# Patient Record
Sex: FEMALE | Race: WHITE | NOT HISPANIC OR LATINO
[De-identification: names, ages, dates, MRNs, and addresses within clinical notes are randomized per-mention and may not be internally consistent; named-entity substitution may affect disease eponyms.]

---

## 2017-08-30 ENCOUNTER — RESULT REVIEW (OUTPATIENT)
Age: 67
End: 2017-08-30

## 2018-01-20 ENCOUNTER — INPATIENT (INPATIENT)
Facility: HOSPITAL | Age: 68
LOS: 6 days | Discharge: HOME | DRG: 166 | End: 2018-01-27
Attending: HOSPITALIST | Admitting: EMERGENCY MEDICINE
Payer: MEDICARE

## 2018-01-20 VITALS
OXYGEN SATURATION: 93 % | DIASTOLIC BLOOD PRESSURE: 86 MMHG | HEART RATE: 91 BPM | TEMPERATURE: 97 F | SYSTOLIC BLOOD PRESSURE: 142 MMHG | RESPIRATION RATE: 18 BRPM

## 2018-01-20 DIAGNOSIS — R06.02 SHORTNESS OF BREATH: ICD-10-CM

## 2018-01-20 DIAGNOSIS — F32.9 MAJOR DEPRESSIVE DISORDER, SINGLE EPISODE, UNSPECIFIED: ICD-10-CM

## 2018-01-20 DIAGNOSIS — Z29.9 ENCOUNTER FOR PROPHYLACTIC MEASURES, UNSPECIFIED: ICD-10-CM

## 2018-01-20 DIAGNOSIS — M35.3 POLYMYALGIA RHEUMATICA: ICD-10-CM

## 2018-01-20 DIAGNOSIS — E03.9 HYPOTHYROIDISM, UNSPECIFIED: ICD-10-CM

## 2018-01-20 DIAGNOSIS — R80.9 PROTEINURIA, UNSPECIFIED: ICD-10-CM

## 2018-01-20 PROBLEM — Z00.00 ENCOUNTER FOR PREVENTIVE HEALTH EXAMINATION: Status: ACTIVE | Noted: 2018-01-20

## 2018-01-20 LAB
ALBUMIN SERPL ELPH-MCNC: 3.1 G/DL — LOW (ref 3.3–5)
ALP SERPL-CCNC: 61 U/L — SIGNIFICANT CHANGE UP (ref 40–120)
ALT FLD-CCNC: 40 U/L RC — SIGNIFICANT CHANGE UP (ref 10–45)
ANION GAP SERPL CALC-SCNC: 12 MMOL/L — SIGNIFICANT CHANGE UP (ref 5–17)
APAP SERPL-MCNC: <15 UG/ML — SIGNIFICANT CHANGE UP (ref 10–30)
APPEARANCE UR: CLEAR — SIGNIFICANT CHANGE UP
AST SERPL-CCNC: 28 U/L — SIGNIFICANT CHANGE UP (ref 10–40)
BASOPHILS # BLD AUTO: 0 K/UL — SIGNIFICANT CHANGE UP (ref 0–0.2)
BASOPHILS NFR BLD AUTO: 0.6 % — SIGNIFICANT CHANGE UP (ref 0–2)
BILIRUB SERPL-MCNC: 0.7 MG/DL — SIGNIFICANT CHANGE UP (ref 0.2–1.2)
BILIRUB UR-MCNC: NEGATIVE — SIGNIFICANT CHANGE UP
BUN SERPL-MCNC: 17 MG/DL — SIGNIFICANT CHANGE UP (ref 7–23)
CALCIUM SERPL-MCNC: 8.9 MG/DL — SIGNIFICANT CHANGE UP (ref 8.4–10.5)
CHLORIDE SERPL-SCNC: 101 MMOL/L — SIGNIFICANT CHANGE UP (ref 96–108)
CO2 SERPL-SCNC: 22 MMOL/L — SIGNIFICANT CHANGE UP (ref 22–31)
COLOR SPEC: YELLOW — SIGNIFICANT CHANGE UP
CREAT SERPL-MCNC: 0.87 MG/DL — SIGNIFICANT CHANGE UP (ref 0.5–1.3)
DIFF PNL FLD: NEGATIVE — SIGNIFICANT CHANGE UP
EOSINOPHIL # BLD AUTO: 0.1 K/UL — SIGNIFICANT CHANGE UP (ref 0–0.5)
EOSINOPHIL NFR BLD AUTO: 1.6 % — SIGNIFICANT CHANGE UP (ref 0–6)
EPI CELLS # UR: SIGNIFICANT CHANGE UP /HPF
ETHANOL SERPL-MCNC: SIGNIFICANT CHANGE UP MG/DL (ref 0–10)
GAS PNL BLDV: SIGNIFICANT CHANGE UP
GLUCOSE SERPL-MCNC: 114 MG/DL — HIGH (ref 70–99)
GLUCOSE UR QL: NEGATIVE — SIGNIFICANT CHANGE UP
HCT VFR BLD CALC: 40.6 % — SIGNIFICANT CHANGE UP (ref 34.5–45)
HGB BLD-MCNC: 13.9 G/DL — SIGNIFICANT CHANGE UP (ref 11.5–15.5)
KETONES UR-MCNC: ABNORMAL
LEUKOCYTE ESTERASE UR-ACNC: NEGATIVE — SIGNIFICANT CHANGE UP
LYMPHOCYTES # BLD AUTO: 1.1 K/UL — SIGNIFICANT CHANGE UP (ref 1–3.3)
LYMPHOCYTES # BLD AUTO: 13.3 % — SIGNIFICANT CHANGE UP (ref 13–44)
MCHC RBC-ENTMCNC: 32.9 PG — SIGNIFICANT CHANGE UP (ref 27–34)
MCHC RBC-ENTMCNC: 34.4 GM/DL — SIGNIFICANT CHANGE UP (ref 32–36)
MCV RBC AUTO: 95.9 FL — SIGNIFICANT CHANGE UP (ref 80–100)
MONOCYTES # BLD AUTO: 0.3 K/UL — SIGNIFICANT CHANGE UP (ref 0–0.9)
MONOCYTES NFR BLD AUTO: 3.5 % — SIGNIFICANT CHANGE UP (ref 2–14)
NEUTROPHILS # BLD AUTO: 6.7 K/UL — SIGNIFICANT CHANGE UP (ref 1.8–7.4)
NEUTROPHILS NFR BLD AUTO: 80.9 % — HIGH (ref 43–77)
NITRITE UR-MCNC: NEGATIVE — SIGNIFICANT CHANGE UP
PH UR: 7 — SIGNIFICANT CHANGE UP (ref 5–8)
PLATELET # BLD AUTO: 180 K/UL — SIGNIFICANT CHANGE UP (ref 150–400)
POTASSIUM SERPL-MCNC: 3.6 MMOL/L — SIGNIFICANT CHANGE UP (ref 3.5–5.3)
POTASSIUM SERPL-SCNC: 3.6 MMOL/L — SIGNIFICANT CHANGE UP (ref 3.5–5.3)
PROT SERPL-MCNC: 6.9 G/DL — SIGNIFICANT CHANGE UP (ref 6–8.3)
PROT UR-MCNC: 100 MG/DL
RAPID RVP RESULT: SIGNIFICANT CHANGE UP
RBC # BLD: 4.23 M/UL — SIGNIFICANT CHANGE UP (ref 3.8–5.2)
RBC # FLD: 13.8 % — SIGNIFICANT CHANGE UP (ref 10.3–14.5)
RBC CASTS # UR COMP ASSIST: SIGNIFICANT CHANGE UP /HPF (ref 0–2)
SALICYLATES SERPL-MCNC: <2 MG/DL — LOW (ref 15–30)
SODIUM SERPL-SCNC: 135 MMOL/L — SIGNIFICANT CHANGE UP (ref 135–145)
SP GR SPEC: >1.03 — HIGH (ref 1.01–1.02)
T3 SERPL-MCNC: 78 NG/DL — LOW (ref 80–200)
T4 AB SER-ACNC: 8.7 UG/DL — SIGNIFICANT CHANGE UP (ref 4.6–12)
TROPONIN T SERPL-MCNC: <0.01 NG/ML — SIGNIFICANT CHANGE UP (ref 0–0.06)
TSH SERPL-MCNC: 7.18 UIU/ML — HIGH (ref 0.27–4.2)
UROBILINOGEN FLD QL: 4 MG/DL
WBC # BLD: 8.2 K/UL — SIGNIFICANT CHANGE UP (ref 3.8–10.5)
WBC # FLD AUTO: 8.2 K/UL — SIGNIFICANT CHANGE UP (ref 3.8–10.5)
WBC UR QL: SIGNIFICANT CHANGE UP /HPF (ref 0–5)

## 2018-01-20 PROCEDURE — 71046 X-RAY EXAM CHEST 2 VIEWS: CPT | Mod: 26

## 2018-01-20 PROCEDURE — 99285 EMERGENCY DEPT VISIT HI MDM: CPT | Mod: 25

## 2018-01-20 PROCEDURE — 71275 CT ANGIOGRAPHY CHEST: CPT | Mod: 26

## 2018-01-20 PROCEDURE — 93010 ELECTROCARDIOGRAM REPORT: CPT

## 2018-01-20 PROCEDURE — 99223 1ST HOSP IP/OBS HIGH 75: CPT | Mod: GC

## 2018-01-20 RX ORDER — AZITHROMYCIN 500 MG/1
500 TABLET, FILM COATED ORAL ONCE
Qty: 0 | Refills: 0 | Status: COMPLETED | OUTPATIENT
Start: 2018-01-20 | End: 2018-01-20

## 2018-01-20 RX ORDER — LEVOTHYROXINE SODIUM 125 MCG
88 TABLET ORAL DAILY
Qty: 0 | Refills: 0 | Status: DISCONTINUED | OUTPATIENT
Start: 2018-01-20 | End: 2018-01-27

## 2018-01-20 RX ORDER — ENOXAPARIN SODIUM 100 MG/ML
40 INJECTION SUBCUTANEOUS EVERY 24 HOURS
Qty: 0 | Refills: 0 | Status: DISCONTINUED | OUTPATIENT
Start: 2018-01-20 | End: 2018-01-27

## 2018-01-20 RX ORDER — SERTRALINE 25 MG/1
50 TABLET, FILM COATED ORAL DAILY
Qty: 0 | Refills: 0 | Status: DISCONTINUED | OUTPATIENT
Start: 2018-01-20 | End: 2018-01-27

## 2018-01-20 RX ORDER — ACETAMINOPHEN 500 MG
650 TABLET ORAL ONCE
Qty: 0 | Refills: 0 | Status: COMPLETED | OUTPATIENT
Start: 2018-01-20 | End: 2018-01-20

## 2018-01-20 RX ORDER — SERTRALINE 25 MG/1
0 TABLET, FILM COATED ORAL
Qty: 0 | Refills: 0 | COMMUNITY

## 2018-01-20 RX ORDER — LEVOTHYROXINE SODIUM 125 MCG
0 TABLET ORAL
Qty: 0 | Refills: 0 | COMMUNITY

## 2018-01-20 RX ORDER — CEFTRIAXONE 500 MG/1
1 INJECTION, POWDER, FOR SOLUTION INTRAMUSCULAR; INTRAVENOUS ONCE
Qty: 0 | Refills: 0 | Status: COMPLETED | OUTPATIENT
Start: 2018-01-20 | End: 2018-01-20

## 2018-01-20 RX ADMIN — AZITHROMYCIN 250 MILLIGRAM(S): 500 TABLET, FILM COATED ORAL at 13:24

## 2018-01-20 RX ADMIN — CEFTRIAXONE 100 GRAM(S): 500 INJECTION, POWDER, FOR SOLUTION INTRAMUSCULAR; INTRAVENOUS at 12:26

## 2018-01-20 RX ADMIN — Medication 650 MILLIGRAM(S): at 17:16

## 2018-01-20 NOTE — H&P ADULT - FAMILY HISTORY
Sibling  Still living? Yes, Estimated age: Age Unknown  Family history of rheumatoid arthritis, Age at diagnosis: Age Unknown  Family history of Raynaud's syndrome, Age at diagnosis: Age Unknown  Family history of mixed connective tissue disease, Age at diagnosis: Age Unknown     Mother  Still living? Yes, Estimated age: Age Unknown  Family history of COPD (chronic obstructive pulmonary disease), Age at diagnosis: Age Unknown

## 2018-01-20 NOTE — H&P ADULT - PROBLEM SELECTOR PLAN 1
- progressively worsening SOB with palpitations, weakness and fatigue for 1 month, differential includes infectious vs rheumatologic (vasculitis, connective tissue disease) vs.   - saturating 93-97% on RA, not tachypneic however is tachycardic  - EKG showed sinus tachycardia  - CTA as above, b/l ground glass opacities  - unli - progressively worsening SOB with palpitations, weakness and fatigue for 1 month, differential includes infectious vs rheumatologic (vasculitis, connective tissue disease) vs. interstitial lung disease  - saturating 93-97% on RA, not tachypneic however is tachycardic  - EKG showed sinus tachycardia  - CTA as above, b/l ground glass opacities, negative for PE  - no evidence of fluid overload, ischemic changes on EKG, troponins neg x2, unlikely cardiac  - afebrile, no leukocytosis, s/p ceftriaxone/azithro in ED, unlikely infectious will hold off on ab for now  - unable to contact rheumatologist for outpt w/u, will resend rheumatologic w/u here given CT findings and family history  - f/u ANCA with reflex MPO and P3, OLEKSANDR, RF, C3, C4, antiGBM  - saturating well on RA, supp O2 if needed

## 2018-01-20 NOTE — H&P ADULT - PROBLEM SELECTOR PLAN 2
- no personal or family history of kidney disease, no history of diabetes  - reports darkening in color of urine, no other symptoms  - f/u rheumatologic w/u as above  - given recent weight gain and chronic steroid use, A1C in AM

## 2018-01-20 NOTE — ED ADULT NURSE NOTE - CHPI ED SYMPTOMS NEG
no dizziness/no tingling/no fever/no decreased eating/drinking/no numbness/no nausea/no vomiting/no pain

## 2018-01-20 NOTE — ED PROVIDER NOTE - MEDICAL DECISION MAKING DETAILS
67F situs inversus depression p/w decreased energy, depression, chest pain, difficulty breathing. stopped prednisone, synthroid, antidepressant last week with worsening of symptoms. cardiopulmoary exam WNL. Appears depressed with no SI/HI. Labs with BNP, TSH, cardiacs, CXR, EKG.

## 2018-01-20 NOTE — H&P ADULT - NSHPLABSRESULTS_GEN_ALL_CORE
Urinalysis + Microscopic Examination (01.20.18 @ 12:25)    Glucose Qualitative, Urine: Negative    Blood, Urine: Negative    Urine Appearance: Clear    Bilirubin: Negative    Color: Yellow    Urobilinogen: 4 mg/dL    Specific Gravity: >1.030    Protein, Urine: 100 mg/dL    pH Urine: 7.0    Leukocyte Esterase Concentration: Negative    Nitrite: Negative    Ketone - Urine: Trace    Red Blood Cell - Urine: 3-5 /HPF    White Blood Cell - Urine: 3-5 /HPF    Epithelial Cells: OCC /HPF    Comprehensive Metabolic Panel (01.20.18 @ 05:01)    Sodium, Serum: 135 mmol/L    Potassium, Serum: 3.6 mmol/L    Chloride, Serum: 101 mmol/L    Carbon Dioxide, Serum: 22 mmol/L    Anion Gap, Serum: 12 mmol/L    Blood Urea Nitrogen, Serum: 17 mg/dL    Creatinine, Serum: 0.87 mg/dL    Glucose, Serum: 114 mg/dL    Calcium, Total Serum: 8.9 mg/dL    Protein Total, Serum: 6.9 g/dL    Albumin, Serum: 3.1 g/dL    Bilirubin Total, Serum: 0.7 mg/dL    Alkaline Phosphatase, Serum: 61 U/L    Aspartate Aminotransferase (AST/SGOT): 28 U/L    Alanine Aminotransferase (ALT/SGPT): 40 U/L RC    eGFR if Non : 68: Interpretative comment    Complete Blood Count + Automated Diff (01.20.18 @ 05:01)    WBC Count: 8.2 K/uL    RBC Count: 4.23 M/uL    Hemoglobin: 13.9 g/dL    Hematocrit: 40.6 %    Mean Cell Volume: 95.9 fl    Mean Cell Hemoglobin: 32.9 pg    Mean Cell Hemoglobin Conc: 34.4 gm/dL    Red Cell Distrib Width: 13.8 %    Platelet Count - Automated: 180 K/uL    Auto Neutrophil #: 6.7 K/uL    Auto Lymphocyte #: 1.1 K/uL    Auto Monocyte #: 0.3 K/uL    Auto Eosinophil #: 0.1 K/uL    Auto Basophil #: 0.0 K/uL    Auto Neutrophil %: 80.9: Differential percentages must be correlated with absolute numbers for  clinical significance. %    Auto Lymphocyte %: 13.3 %    Auto Monocyte %: 3.5 %    Auto Eosinophil %: 1.6 %    Auto Basophil %: 0.6 %    < from: CT Angio Chest w/ IV Cont (01.20.18 @ 08:50) >  IMPRESSION:   No pulmonary embolism.  Scattered bilateral perihilar groundglass opacities with interlobular   septal thickening. The differential diagnosis includes  mild pulmonary   edema and pneumonia.  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

## 2018-01-20 NOTE — ED PROVIDER NOTE - ATTENDING CONTRIBUTION TO CARE
Patient presenting with shortness of breath occurring in episodes over the past week, associated with generalized malaise/weakness/lack of energy.  Patient reporting that beginning in october she was having generalized muscle aches and pains after starting a statin which have now resolved since discontinuing, however in process of workup saw a rheumatologist who started her on a long steroid taper.  Last week as she began having weakness, tried seeing PMD/rheumatologist but was not able to be seen.  Reporting weakness/shortness of breath always present but acutely worsens some days without clear triggers.  Denying chest pains.  Able to ambulate but having difficulty with accomplishing ADLs due to weakness/malaise.  Some occasional mild headaches.  No visual changes, no abdominal pains N/V/C/D, no focal weakness or sensation changes, no dysuria.  No fevers or chills.    On exam patients vital signs within normal limits, RRR S1/S2, lungs clear to ascultation bilaterally, abdomen soft non tender, normal strength/sensation, no noted rashes.    Patient presenting with generalized malaise/weakness of unclear etiology, currently afebrile with normal vital signs, do not believe patient actively septic, history seems less consistent with ACS but cannot rule out by history and physical alone, plan for labs, CXR, TSH, CE/BNP, UA, re-evaluation.

## 2018-01-20 NOTE — H&P ADULT - HISTORY OF PRESENT ILLNESS
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 In November 2017 she started having diffuse sharp muscle pains throughout her body and bulging of her muscles. She was sent to a rheumatologist and diagnosed with PMR. She had a temporal artery biopsy which was negative for GCA. She was started on prednisone 6mg in the beginning of December and has been tapering down since. She is currently prescribed 3mg. In mid December her muscle pains and bulging did not improve with the steroids so she stopped taking her crestor as she has had muscle cramps in the past with other statins. After this the pains had resolved and she has not had any since. However, since starting the steroids she has had increasingly worsening SOB where she becomes tachypneic and feels she cannot get a good breath in. This is associated with palpitations. She also reports some dull left sided chest pain when she becomes short of breath that does not radiate. Denies lightheadedness, dizziness or vision changes. She reports a 13-15 pound weight gain in the past 1-2 months. These symptoms have gotten significantly worse since Sunday and she cannot recall when she stopped taking her medications but she does remember that she has not taken them consistently this week. She reports weakness to the point of not being able to get out full sentences and has not had much physical activity.    In the ED, vitals were 67F with situs inversus, PMR on steroids, hyperlipidemia, hypothyroidism presenting with 1 month of progressively worsening shortness of breath, palpitations, weakness and fatigue. In November 2017 she started having diffuse sharp muscle pains throughout her body and bulging of her muscles. She was sent to a rheumatologist and diagnosed with PMR. She had a temporal artery biopsy which was negative for GCA. She was started on prednisone 6mg in the beginning of December and has been tapering down since. She is currently prescribed 3mg. In mid December her muscle pains and bulging did not improve with the steroids so she stopped taking her crestor as she has had muscle cramps in the past with other statins. After this the pains had resolved and she has not had any since. However, since starting the steroids she has had increasingly worsening SOB where she becomes tachypneic and feels she cannot get a good breath in. This is associated with palpitations. She also reports some dull left sided chest pain when she becomes short of breath that does not radiate. Denies lightheadedness, dizziness or vision changes. She reports a 13-15 pound weight gain in the past 1-2 months. These symptoms have gotten significantly worse since Sunday and she cannot recall when she stopped taking her medications but she does remember that she has not taken them consistently this week. She reports weakness to the point of not being able to get out full sentences and has not had much physical activity.    In the ED, vitals were 142/86, 97.2F, HR 91, 18-93%RA. CTA showed b/l groundglass opacities and UA significant for proteinuria. She was given ceftriaxone/azithro.

## 2018-01-20 NOTE — ED PROVIDER NOTE - OBJECTIVE STATEMENT
66yo female with pmhx of situs inversus w/ dextrocardia, HLD and hypothyroidism who presents with two of weeks of worsening dyspnea, myalgia and generalized weakness.  Pt states that she believes her weakness began shortly after beginning new medication Crestor.  Pt also endorses chest pain with deep breathing.  Pt was seen by her PMD who recently diagnosed her with Polymyalgia Rheumatica.  Pt was started on 40 mg of Prednisone and a temporal artery biopsy was performed.  Biopsy was found to be negative for Giant Cell Arteritis. Dosage of prednisone was decreased to 30 mg, however, pt reports she stopped taking all medications including synthroid and prednisone abruptly without completing taper about 1 week ago.  Of note, pt endorses feelings of depression.    last year.  Pt denies fever, chills, chest pain, palpitations, dizziness, recent cough, nausea, vomiting, diarrhea, abdominal pain, bladder and bowel problems, back pain, leg swelling, sick contact, recent long travel.    ROS  depression  caring for mother      Significant past medical hx/surgical hx/social hx and review of systems can be found above in the history of present illness. 66yo female pmh of situs inversus w/ dextrocardia, HLD and hypothyroidism p/w worsening dyspnea, myalgia and generalized weakness x 2w.  Weakness began shortly after beginning new medication Crestor 4 months ago. Pt was seen by her PMD who recently diagnosed her with Polymyalgia Rheumatica, started on 40mg Prednisone and a temporal artery biopsy - negative for Giant Cell Arteritis. Prednisone was decreased to 30 mg, however, pt reports she stopped taking all medications including synthroid and prednisone abruptly without completing taper about 1 week ago.  Of note, pt endorses feelings of depression.    2 years ago.  Pt denies fever, chills, chest pain, palpitations, dizziness, recent cough, nausea, vomiting, diarrhea, abdominal pain, bladder and bowel problems, back pain, leg swelling, sick contact, recent long travel, SI, HI    Significant past medical hx/surgical hx/social hx and review of systems can be found above in the history of present illness.

## 2018-01-20 NOTE — ED PROVIDER NOTE - PROGRESS NOTE DETAILS
Pt appears improved, eyes open, states she feels better although is having pleuritic chest pain. Ordered CTA r/o PE/ Attending MD MAGAÑA.  PT signed out to me in stable condition. Pt is a 67 yr old female with hx of HLD, hypothyroidism previously now presented to ED with SOB and intermittent CP happening over last week.  Pt has been having ongoing muscle pains for a month, started on a statin.  Saw rheum who bxed for GCA, polymyalagia rhuemat but neg.  Pt stopped statin and pain went away however now over the last few days pt has been having generalized pain/weakness.  Seen at ED sev'l days ago but they 'did nothing'.  Vitals wnl.  Pt is afebrile.  Pt states she is beginning to feel better without intervention in ED.  Pt to be reassessed and referred to a new PMD at her request.  Pt has situs inversus.  Pt pending CTA for eval for poss PE given report of mild chest discomfort with deep inspiration.  Plan to obtain repeat troponin, CTA chest and refer to new PCP as outpt. [employer]:  Pt has had episode of desat in ED and endorses persistent chest discomfort.  CT c/w poss PNA vs. pulm edema.  Pt recently worked up for unclear poss rheumatologic disorder.  Concern for possibel underlying rheumatologic pulmonary disease.  Warrants admission for supportive care and possible rheum/ACS work-up.  Will tx for infection given poss PNA. [employer]:  Pt has had episode of desat in ED and endorses persistent chest discomfort.  Also noted to be persistently tachycardic.  CT c/w poss PNA vs. pulm edema without evidence of PE.  Pt recently worked up for unclear poss rheumatologic disorder.  Concern for possibel underlying rheumatologic pulmonary disease.  Warrants admission for supportive care and possible rheum/ACS work-up.  Will tx for infection given poss PNA.

## 2018-01-20 NOTE — H&P ADULT - ATTENDING COMMENTS
67F with situs inversus, PMR on Prednisone ,HLD, Hypothyroidism who presents with c/o progressively worsening shortness of breath, palpitations, weakness and fatigue x 1 month. Also c/o left sided CP. On exam, AAO x 3, tachycardic at 111bpm, RR is 18, saturating at 95% on room air. Chest is clear to auscultation b/l. Labs are negative for leukocytosis; Troponins are negative x 2; RVP negative. CTA chest is negative for PE but demonstrates b/l ground glass opacities.   Assessment : Dyspnea; unlikely to be infectious etiology in the absence of leukocytosis/fever; possible interstitial lung dxs  Plan : c/w prednisone for now, O2 supplementation prn. hold off on abx, f/up rheumatological w/up, obtain collateral information from pt's primary rheumatologist, c/w home medications ( synthroid, sertraline), dvt ppx.

## 2018-01-20 NOTE — H&P ADULT - NSHPPHYSICALEXAM_GEN_ALL_CORE
GENERAL: NAD, well-developed, laying in stretcher, periodically answers questions with eyes closed, appears fatigued  HEAD:  Atraumatic, Normocephalic  EYES: EOMI, PERRLA, conjunctiva and sclera clear  NECK: Supple, No JVD  CHEST/LUNG: Clear to auscultation bilaterally; No wheezes, rales or rhonchi  HEART: tachycardic rate and regular rhythm; No murmurs, rubs, or gallops  ABDOMEN: Soft, Nontender, Nondistended; Bowel sounds present  EXTREMITIES:  2+ Peripheral Pulses, No clubbing, cyanosis, or edema, tenderness to palpation of b/l calves  PSYCH: AAOx3  NEUROLOGY: non-focal  SKIN: No rashes or lesions

## 2018-01-20 NOTE — H&P ADULT - ASSESSMENT
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

## 2018-01-20 NOTE — ED ADULT NURSE REASSESSMENT NOTE - NS ED NURSE REASSESS COMMENT FT1
RN spoke with MD Galicia, per MD she will order 1 time tylenol order for pt. Safety and comfort measures maintained.

## 2018-01-20 NOTE — H&P ADULT - NSHPREVIEWOFSYSTEMS_GEN_ALL_CORE
CONSTITUTIONAL:  + weight gain, + weakness and fatigue, No fever, chills  HEENT:  Eyes:  No visual loss, blurred vision, double vision or yellow sclerae.   Ears, Nose, Throat: +bloody nasal discharge, No hearing loss  SKIN:  No rash or itching.  CARDIOVASCULAR:  + chest pain, +palpitations, No chest pressure or chest discomfort. No edema  RESPIRATORY:  + shortness of breath, No cough or sputum.  GASTROINTESTINAL:  No anorexia, nausea, vomiting or diarrhea. No abdominal pain or blood.  GENITOURINARY:  No hematuria, dysuria.   NEUROLOGICAL:  + headache, No dizziness, syncope, paralysis, ataxia, numbness or tingling in the extremities. No change in bowel or bladder control.  MUSCULOSKELETAL:  No muscle, back pain, joint pain or stiffness.  PSYCHIATRIC:  No history of depression or anxiety.  ENDOCRINOLOGIC:  No reports of sweating, cold or heat intolerance. No polyuria or polydipsia.

## 2018-01-20 NOTE — H&P ADULT - NSHPSOCIALHISTORY_GEN_ALL_CORE
Patient lives at home with her mother. Remote smoking history 40 years ago, 3 cigarettes weekly. No alcohol use.

## 2018-01-20 NOTE — H&P ADULT - PMH
Depression    Hyperlipidemia, unspecified hyperlipidemia type    Hypothyroid    Polymyalgia rheumatica

## 2018-01-20 NOTE — ED ADULT NURSE REASSESSMENT NOTE - NS ED NURSE REASSESS COMMENT FT1
Report received from SILVANA Moy.  VSS NAD at this time. A&Ox4 gross neuro intact, lungs cta bilaterally, no difficulty speaking in complete sentences, s1s2 heart sounds heard, pulses x 4, kerr x4, abdomen soft nontender nondistended, skin intact. Plan is for pt. to have CT scan and then possibly be admitted. Safety and comfort measures maintained.

## 2018-01-20 NOTE — H&P ADULT - PROBLEM SELECTOR PLAN 4
- diagnosed in November after complaining of diffuse muscle pains, pt states rheumatologist diagnosed with a blood test but unsure of what it was  - started on steroids at that time, with resolution of muscle pains only after stopping crestor 2 weeks later  - will continue with steroids here as she has chronically been on them pending further rheum w/u, may need higher dose

## 2018-01-20 NOTE — ED ADULT NURSE REASSESSMENT NOTE - NS ED NURSE REASSESS COMMENT FT1
Pt found to have temp 100.4. MD Galicia paged. Awaiting to receive response from MD. Safety and comfort measures maintained. Pt. has no complaints at this time.

## 2018-01-20 NOTE — ED ADULT NURSE REASSESSMENT NOTE - NS ED NURSE REASSESS COMMENT FT1
Pt currently resting comfortably in hallway. Pt admitted currently awaiting inpatient bed. Lunch provided to patient. Safety and comfort measures maintained.

## 2018-01-20 NOTE — ED ADULT NURSE NOTE - OBJECTIVE STATEMENT
Patient presents with c/o SOB x4 weeks worsening over the past few days. Patient with history of depression and hypothyroidism presents with c/o SOB x4 weeks worsening over the past few days. Patient with history of depression and hypothyroidism presents with c/o SOB x4 weeks worsening over the past few days.  Patient states symptoms began shortly after beginning to take Crestor, has stopped taking it.  Patient was sent to rheumatologist and diagnosed with Polymyalgia Rheumatica and started on steroids, which she stopped taking abruptly.  Patient was taking zoloft,  passed away last year.  Patient denies CP. pain with deep inspiration, recent long trips.  Patient stopped smoking about thirty years ago.  No swelling of lower extremities.

## 2018-01-21 LAB
ANION GAP SERPL CALC-SCNC: 12 MMOL/L — SIGNIFICANT CHANGE UP (ref 5–17)
BUN SERPL-MCNC: 15 MG/DL — SIGNIFICANT CHANGE UP (ref 7–23)
CALCIUM SERPL-MCNC: 8.9 MG/DL — SIGNIFICANT CHANGE UP (ref 8.4–10.5)
CHLORIDE SERPL-SCNC: 101 MMOL/L — SIGNIFICANT CHANGE UP (ref 96–108)
CO2 SERPL-SCNC: 22 MMOL/L — SIGNIFICANT CHANGE UP (ref 22–31)
CREAT SERPL-MCNC: 0.81 MG/DL — SIGNIFICANT CHANGE UP (ref 0.5–1.3)
GLUCOSE SERPL-MCNC: 127 MG/DL — HIGH (ref 70–99)
HBA1C BLD-MCNC: 6.7 % — HIGH (ref 4–5.6)
HCT VFR BLD CALC: 38.7 % — SIGNIFICANT CHANGE UP (ref 34.5–45)
HGB BLD-MCNC: 13.1 G/DL — SIGNIFICANT CHANGE UP (ref 11.5–15.5)
MAGNESIUM SERPL-MCNC: 1.8 MG/DL — SIGNIFICANT CHANGE UP (ref 1.6–2.6)
MCHC RBC-ENTMCNC: 32 PG — SIGNIFICANT CHANGE UP (ref 27–34)
MCHC RBC-ENTMCNC: 33.9 GM/DL — SIGNIFICANT CHANGE UP (ref 32–36)
MCV RBC AUTO: 94.4 FL — SIGNIFICANT CHANGE UP (ref 80–100)
PLATELET # BLD AUTO: 198 K/UL — SIGNIFICANT CHANGE UP (ref 150–400)
POTASSIUM SERPL-MCNC: 3.8 MMOL/L — SIGNIFICANT CHANGE UP (ref 3.5–5.3)
POTASSIUM SERPL-SCNC: 3.8 MMOL/L — SIGNIFICANT CHANGE UP (ref 3.5–5.3)
RBC # BLD: 4.1 M/UL — SIGNIFICANT CHANGE UP (ref 3.8–5.2)
RBC # FLD: 15.2 % — HIGH (ref 10.3–14.5)
RHEUMATOID FACT SERPL-ACNC: <7 IU/ML — SIGNIFICANT CHANGE UP (ref 0–13.9)
SODIUM SERPL-SCNC: 135 MMOL/L — SIGNIFICANT CHANGE UP (ref 135–145)
WBC # BLD: 7.27 K/UL — SIGNIFICANT CHANGE UP (ref 3.8–10.5)
WBC # FLD AUTO: 7.27 K/UL — SIGNIFICANT CHANGE UP (ref 3.8–10.5)

## 2018-01-21 PROCEDURE — 99233 SBSQ HOSP IP/OBS HIGH 50: CPT | Mod: GC

## 2018-01-21 RX ORDER — ACETAMINOPHEN 500 MG
650 TABLET ORAL EVERY 6 HOURS
Qty: 0 | Refills: 0 | Status: DISCONTINUED | OUTPATIENT
Start: 2018-01-21 | End: 2018-01-27

## 2018-01-21 RX ADMIN — Medication 3 MILLIGRAM(S): at 06:38

## 2018-01-21 RX ADMIN — Medication 88 MICROGRAM(S): at 06:38

## 2018-01-21 RX ADMIN — Medication 650 MILLIGRAM(S): at 16:13

## 2018-01-21 RX ADMIN — Medication 7 MILLIGRAM(S): at 20:05

## 2018-01-21 RX ADMIN — Medication 650 MILLIGRAM(S): at 17:00

## 2018-01-21 RX ADMIN — SERTRALINE 50 MILLIGRAM(S): 25 TABLET, FILM COATED ORAL at 14:40

## 2018-01-21 RX ADMIN — ENOXAPARIN SODIUM 40 MILLIGRAM(S): 100 INJECTION SUBCUTANEOUS at 06:38

## 2018-01-21 NOTE — PROGRESS NOTE ADULT - PROBLEM SELECTOR PLAN 1
- progressively worsening SOB with palpitations, weakness and fatigue for 1 month, differential includes infectious vs rheumatologic (vasculitis, connective tissue disease) vs. interstitial lung disease  - saturating 93-97% on RA, not tachypneic however is tachycardic  - EKG showed sinus tachycardia  - CTA as above, b/l ground glass opacities, negative for PE  - no evidence of fluid overload, ischemic changes on EKG, troponins neg x2, unlikely cardiac  - afebrile, no leukocytosis, s/p ceftriaxone/azithro in ED, unlikely infectious will hold off on ab for now  - unable to contact rheumatologist for outpt w/u, will resend rheumatologic w/u here given CT findings and family history  - f/u ANCA with reflex MPO and P3, OLEKSANDR, RF, C3, C4, antiGBM  - saturating well on RA, supp O2 if needed

## 2018-01-21 NOTE — CONSULT NOTE ADULT - ASSESSMENT
68 yo F with recent dx of PMR on chronic steroids since Nov, now with severe SOB with saturation around 93-95% on NC  Concern for infectious etiology (PCP) vs autoimmune etiology (?AAV with hx of nasal crusting)    Plan:  - would review CT chest with radiology to specificy GGO - ?NSIP/UIP  - obtain LDH and Beta-D-glucan assay for PCP  - ambulate pt and record saturation  - obtain ID eval for possible PCP dx and therapy  - prednisone 10mg daily (pt last dose that she recalls)    Dr. Castro  Rheumatology Fellow  [phone] 68 yo F with recent dx of PMR on chronic steroids since Nov, now with severe SOB with saturation around 93-95% on NC  Concern for infectious etiology (PCP) vs autoimmune etiology (?AAV with hx of nasal crusting) although unusual to develop vasculitis or pneumonitis on high dose steroids which the patient has been on since late Nov 2017. Less likely PMR given history and that she has been on high doses of prednisone.      Plan:  - would review CT chest with radiology to specify GGO - ?NSIP/UIP  - obtain LDH and Beta-D-glucan assay for PCP  - ambulate pt and record saturation  - obtain ID eval for possible PCP dx and therapy  - prednisone 10mg daily (pt last dose that she recalls)  - F/u serologies.  - Urine TP/Cr ordered to quantify proteinuria  - will try to obtain prior records from her rheumatologist in AM    Dr. Castro  Rheumatology Fellow  [phone]

## 2018-01-21 NOTE — PROGRESS NOTE ADULT - ASSESSMENT
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 Pending rheumatologic w/u.

## 2018-01-21 NOTE — CONSULT NOTE ADULT - SUBJECTIVE AND OBJECTIVE BOX
JOHN GAMEZ  [id number]    HISTORY OF PRESENT ILLNESS:    66 yo F with recent dx of PMR, was on prednisone ?10-20mg daily, now admitted for SOB and palpitation.  States she developed diffuse myalgia last September; incidentally she was started on statin a few months prior to the onset of this muscle pain.  She recalls, she has history of intolerance to statin as it made her feel uncomfortable.  She was seen by a rheum in Nov and ultimately dx with PMR/GCA based on the myalgia (no weakness was noted) - s/p L temporal artery bx which was negative; pt did not have any vision changes or jaw claudication.  At the time of dx pt was started on Prednisone 60mg daily and states she had no change in her symptoms with the prednisone.  She decided to self d/c statin and she felt much better after two days.  She was tapered down from prednisone 60mg to ~20mg over the last one month.  Reports feeling SOB over the last two weeks.  She also has dec appeite and fatigue for the last two weeks - she became so fatigued she stopped takign her medications, including prednisone abruptly as of last Tuesday.  reports bloody nasal crusting but no epistanxis or hemoptysis. Reports changes in her voice.  No consititional symtpoms.  No oral uclers, LAD, chest pain, abd pain, dysuria, hematuria, foamy urine, photosensitivity, falls, muscle weakness, joint pain or morning stiffness or raynouds.    Pt is retirned, lives with her mother  Never has been preg  Sister with hx of RA/MCTD/Raynouds/follows at [facility]    PAST MEDICAL & SURGICAL HISTORY:  Hyperlipidemia, unspecified hyperlipidemia type  Polymyalgia rheumatica  Depression  Hypothyroid      Review of Systems:  as aboev    MEDICATIONS  (STANDING):  enoxaparin Injectable 40 milliGRAM(s) SubCutaneous every 24 hours  levothyroxine 88 MICROGram(s) Oral daily  predniSONE   Tablet 3 milliGRAM(s) Oral daily  sertraline 50 milliGRAM(s) Oral daily    MEDICATIONS  (PRN):  acetaminophen   Tablet. 650 milliGRAM(s) Oral every 6 hours PRN mild or moderate pain      Allergies    Vitamin E (Hives)    Intolerances    Artifical Cherry Flavoring (Nausea)  narcotic analgesics (Unknown)      PERTINENT MEDICATION HISTORY:    SOCIAL HISTORY: non smoker, no alcohol  OCCUPATION: retired  TRAVEL HISTORY: none    FAMILY HISTORY:  Family history of COPD (chronic obstructive pulmonary disease) (Mother)  Family history of mixed connective tissue disease (Sibling)  Family history of Raynaud's syndrome (Sibling)  Family history of rheumatoid arthritis (Sibling)      Vital Signs Last 24 Hrs  T(C): 36.6 (21 Jan 2018 07:21), Max: 38 (20 Jan 2018 16:33)  T(F): 97.8 (21 Jan 2018 07:21), Max: 100.4 (20 Jan 2018 16:33)  HR: 98 (21 Jan 2018 07:21) (94 - 108)  BP: 120/78 (21 Jan 2018 07:21) (109/76 - 124/75)  BP(mean): --  RR: 20 (21 Jan 2018 07:21) (18 - 20)  SpO2: 95% (21 Jan 2018 07:21) (94% - 97%)    Daily Height in cm: 165.1 (20 Jan 2018 22:40)    Daily     Physical Exam:  General: mild distress - crying  HEENT: EOMI, MMM  CVS: +S1/S2, RRR  Resp: CTA b/l  GI: Soft, NT/ND +BS  MSK: MS 5/5 in UE and LE b/l; no synovitis  Neuro: AAOx3  Skin: no visible rashes    LABS:                        13.1   7.27  )-----------( 198      ( 21 Jan 2018 08:43 )             38.7     01-21    135  |  101  |  15  ----------------------------<  127<H>  3.8   |  22  |  0.81    Ca    8.9      21 Jan 2018 08:48  Mg     1.8     01-21    TPro  6.9  /  Alb  3.1<L>  /  TBili  0.7  /  DBili  x   /  AST  28  /  ALT  40  /  AlkPhos  61  01-20      Urinalysis Basic - ( 20 Jan 2018 12:25 )    Color: Yellow / Appearance: Clear / SG: >1.030 / pH: x  Gluc: x / Ketone: Trace  / Bili: Negative / Urobili: 4 mg/dL   Blood: x / Protein: 100 mg/dL / Nitrite: Negative   Leuk Esterase: Negative / RBC: 3-5 /HPF / WBC 3-5 /HPF   Sq Epi: x / Non Sq Epi: OCC /HPF / Bacteria: x        RADIOLOGY & ADDITIONAL STUDIES:  < from: CT Angio Chest w/ IV Cont (01.20.18 @ 08:50) >    EXAM:  CT ANGIO CHEST (W)AW IC                            PROCEDURE DATE:  01/20/2018            INTERPRETATION:  CLINICAL INFORMATION: Shortness of breath.    COMPARISON: Correlation is made with chest x-ray dated 1/20/2018 at 5:51   AM.    PROCEDURE:   CTA of the Chest was performed with intravenous contrast.  90 ml of Omnipaque 350 was injected intravenously. 10 ml were discarded.  Sagittal and coronal reformats were performed as well as 3D Mini MIPs.      FINDINGS:    CHEST:     LUNGS AND LARGE AIRWAYS: Patent central airways. Scattered bilateral   perihilar groundglass opacities, with upper lobe predominance and   interlobular septal thickening.  PLEURA: No pleural effusion.  VESSELS: No pulmonary embolism.  HEART: Dextrocardia. Right aortic arch. No pericardial effusion.  MEDIASTINUM AND NEEL: Less than 1 cm bilateral hilar lymph nodes.  CHEST WALL AND LOWER NECK: Thyroid gland is diminutive.  VISUALIZED UPPER ABDOMEN: Situs inversus. Stenosis of the proximal celiac   artery.  BONES: Mild degenerative changes in the spine.    IMPRESSION:   No pulmonary embolism.    Scattered bilateral perihilar groundglass opacities with interlobular   septal thickening. The differential diagnosis includes  mild pulmonary   edema and pneumonia.    Situs inversus.    Stenosis of the proximal celiac artery.

## 2018-01-21 NOTE — PROGRESS NOTE ADULT - SUBJECTIVE AND OBJECTIVE BOX
Patient is a 67y old  Female who presents with a chief complaint of SOB, palpitations (20 Jan 2018 14:58)      SUBJECTIVE / OVERNIGHT EVENTS:    MEDICATIONS  (STANDING):  enoxaparin Injectable 40 milliGRAM(s) SubCutaneous every 24 hours  levothyroxine 88 MICROGram(s) Oral daily  predniSONE   Tablet 3 milliGRAM(s) Oral daily  sertraline 50 milliGRAM(s) Oral daily      PHYSICAL EXAM:  Vital Signs Last 24 Hrs  T(C): 36.6 (21 Jan 2018 07:21), Max: 38 (20 Jan 2018 16:33)  T(F): 97.8 (21 Jan 2018 07:21), Max: 100.4 (20 Jan 2018 16:33)  HR: 98 (21 Jan 2018 07:21) (90 - 111)  BP: 120/78 (21 Jan 2018 07:21) (109/76 - 124/75)  BP(mean): --  RR: 20 (21 Jan 2018 07:21) (15 - 20)  SpO2: 95% (21 Jan 2018 07:21) (94% - 97%)    GENERAL: NAD, well-developed  HEAD:  Atraumatic, Normocephalic  EYES: EOMI, PERRLA, conjunctiva and sclera clear  NECK: Supple, No JVD  CHEST/LUNG: Clear to auscultation bilaterally; No wheeze  HEART: Regular rate and rhythm; No murmurs, rubs, or gallops  ABDOMEN: Soft, Nontender, Nondistended; Bowel sounds present  EXTREMITIES:  2+ Peripheral Pulses, No clubbing, cyanosis, or edema, tenderness to palpation of b/l calves  PSYCH: AAOx3  NEUROLOGY: non-focal  SKIN: No rashes or lesions    LABS:    WBC Trend: 8.2<--  Hb Trend: 13.9<--  Plt Trend: 180<--  01-20    135  |  101  |  17  ----------------------------<  114<H>  3.6   |  22  |  0.87    Ca    8.9      20 Jan 2018 05:01    TPro  6.9  /  Alb  3.1<L>  /  TBili  0.7  /  DBili  x   /  AST  28  /  ALT  40  /  AlkPhos  61  01-20    Creatinine Trend: 0.87<--    CARDIAC MARKERS ( 20 Jan 2018 10:00 )  Trop <0.01 ng/mL / CK x     / CKMB x       CARDIAC MARKERS ( 20 Jan 2018 05:01 )  Trop <0.01 ng/mL / CK 66 U/L / CKMB x           Urinalysis Basic - ( 20 Jan 2018 12:25 )    Color: Yellow / Appearance: Clear / SG: >1.030 / pH: x  Gluc: x / Ketone: Trace  / Bili: Negative / Urobili: 4 mg/dL   Blood: x / Protein: 100 mg/dL / Nitrite: Negative   Leuk Esterase: Negative / RBC: 3-5 /HPF / WBC 3-5 /HPF   Sq Epi: x / Non Sq Epi: OCC /HPF / Bacteria: x        Microbiology:  Urine Cx:  Blood Cx:    RADIOLOGY & ADDITIONAL TESTS:  X- Ray:  CT:  Ultrasound:  [ ] imaging personally reviewed and interpreted by me    Consultant(s) Notes Reviewed:      Care Discussed with Consultants/Other Providers: Patient is a 67y old  Female who presents with a chief complaint of SOB, palpitations (20 Jan 2018 14:58)      SUBJECTIVE / OVERNIGHT EVENTS: No acute events overnight. Patient states she feels more alert today however continues to have SOB and fatigue. Also complaining of muscle pains in her back as well as "Bone" pain in her lower back. Denies CP, dizziness, abdominal pain, N/V. Reports she does have a mild headache that has been going on for a while that migrates throughout her head.    MEDICATIONS  (STANDING):  enoxaparin Injectable 40 milliGRAM(s) SubCutaneous every 24 hours  levothyroxine 88 MICROGram(s) Oral daily  predniSONE   Tablet 3 milliGRAM(s) Oral daily  sertraline 50 milliGRAM(s) Oral daily      PHYSICAL EXAM:  Vital Signs Last 24 Hrs  T(C): 36.6 (21 Jan 2018 07:21), Max: 38 (20 Jan 2018 16:33)  T(F): 97.8 (21 Jan 2018 07:21), Max: 100.4 (20 Jan 2018 16:33)  HR: 98 (21 Jan 2018 07:21) (90 - 111)  BP: 120/78 (21 Jan 2018 07:21) (109/76 - 124/75)  BP(mean): --  RR: 20 (21 Jan 2018 07:21) (15 - 20)  SpO2: 95% (21 Jan 2018 07:21) (94% - 97%)    GENERAL: NAD, well-developed  HEAD:  Atraumatic, Normocephalic  EYES: EOMI, PERRLA, conjunctiva and sclera clear  NECK: Supple, No JVD  CHEST/LUNG: Clear to auscultation bilaterally; No wheeze  HEART: Regular rate and rhythm; No murmurs, rubs, or gallops  ABDOMEN: Soft, Nontender, Nondistended; Bowel sounds present  BACK: ttp of paraspinal muscles in thoracic region, ttp at SI joints, no tenderness on spine  EXTREMITIES:  2+ Peripheral Pulses, No clubbing, cyanosis, or edema, tenderness to palpation of b/l calves  PSYCH: AAOx3  NEUROLOGY: non-focal  SKIN: No rashes or lesions    LABS:    WBC Trend: 8.2<--  Hb Trend: 13.9<--  Plt Trend: 180<--  01-20    135  |  101  |  17  ----------------------------<  114<H>  3.6   |  22  |  0.87    Ca    8.9      20 Jan 2018 05:01    TPro  6.9  /  Alb  3.1<L>  /  TBili  0.7  /  DBili  x   /  AST  28  /  ALT  40  /  AlkPhos  61  01-20    Creatinine Trend: 0.87<--    CARDIAC MARKERS ( 20 Jan 2018 10:00 )  Trop <0.01 ng/mL / CK x     / CKMB x       CARDIAC MARKERS ( 20 Jan 2018 05:01 )  Trop <0.01 ng/mL / CK 66 U/L / CKMB x           Urinalysis Basic - ( 20 Jan 2018 12:25 )    Color: Yellow / Appearance: Clear / SG: >1.030 / pH: x  Gluc: x / Ketone: Trace  / Bili: Negative / Urobili: 4 mg/dL   Blood: x / Protein: 100 mg/dL / Nitrite: Negative   Leuk Esterase: Negative / RBC: 3-5 /HPF / WBC 3-5 /HPF   Sq Epi: x / Non Sq Epi: OCC /HPF / Bacteria: x        Microbiology:  Urine Cx:  Blood Cx:    RADIOLOGY & ADDITIONAL TESTS:  X- Ray:  CT:  Ultrasound:  [ ] imaging personally reviewed and interpreted by me    Consultant(s) Notes Reviewed:      Care Discussed with Consultants/Other Providers:

## 2018-01-22 DIAGNOSIS — J96.01 ACUTE RESPIRATORY FAILURE WITH HYPOXIA: ICD-10-CM

## 2018-01-22 DIAGNOSIS — E11.9 TYPE 2 DIABETES MELLITUS WITHOUT COMPLICATIONS: ICD-10-CM

## 2018-01-22 LAB
ANION GAP SERPL CALC-SCNC: 14 MMOL/L — SIGNIFICANT CHANGE UP (ref 5–17)
BASOPHILS # BLD AUTO: 0.02 K/UL — SIGNIFICANT CHANGE UP (ref 0–0.2)
BASOPHILS NFR BLD AUTO: 0.2 % — SIGNIFICANT CHANGE UP (ref 0–2)
BUN SERPL-MCNC: 15 MG/DL — SIGNIFICANT CHANGE UP (ref 7–23)
C3 SERPL-MCNC: 178 MG/DL — SIGNIFICANT CHANGE UP (ref 80–180)
C4 SERPL-MCNC: 34 MG/DL — SIGNIFICANT CHANGE UP (ref 10–45)
CALCIUM SERPL-MCNC: 9.4 MG/DL — SIGNIFICANT CHANGE UP (ref 8.4–10.5)
CHLORIDE SERPL-SCNC: 100 MMOL/L — SIGNIFICANT CHANGE UP (ref 96–108)
CO2 SERPL-SCNC: 21 MMOL/L — LOW (ref 22–31)
CREAT ?TM UR-MCNC: 267 MG/DL — SIGNIFICANT CHANGE UP
CREAT SERPL-MCNC: 0.89 MG/DL — SIGNIFICANT CHANGE UP (ref 0.5–1.3)
CRP SERPL-MCNC: 8.6 MG/DL — HIGH (ref 0–0.4)
EOSINOPHIL # BLD AUTO: 0.06 K/UL — SIGNIFICANT CHANGE UP (ref 0–0.5)
EOSINOPHIL NFR BLD AUTO: 0.7 % — SIGNIFICANT CHANGE UP (ref 0–6)
ERYTHROCYTE [SEDIMENTATION RATE] IN BLOOD: 44 MM/HR — HIGH (ref 0–20)
GBM IGG SER-ACNC: <0.2 U — SIGNIFICANT CHANGE UP
GLUCOSE SERPL-MCNC: 123 MG/DL — HIGH (ref 70–99)
HCT VFR BLD CALC: 37.4 % — SIGNIFICANT CHANGE UP (ref 34.5–45)
HGB BLD-MCNC: 12.5 G/DL — SIGNIFICANT CHANGE UP (ref 11.5–15.5)
IMM GRANULOCYTES NFR BLD AUTO: 1.1 % — SIGNIFICANT CHANGE UP (ref 0–1.5)
LDH SERPL L TO P-CCNC: 505 U/L — HIGH (ref 50–242)
LYMPHOCYTES # BLD AUTO: 1.43 K/UL — SIGNIFICANT CHANGE UP (ref 1–3.3)
LYMPHOCYTES # BLD AUTO: 17.7 % — SIGNIFICANT CHANGE UP (ref 13–44)
MAGNESIUM SERPL-MCNC: 2.1 MG/DL — SIGNIFICANT CHANGE UP (ref 1.6–2.6)
MCHC RBC-ENTMCNC: 31.6 PG — SIGNIFICANT CHANGE UP (ref 27–34)
MCHC RBC-ENTMCNC: 33.4 GM/DL — SIGNIFICANT CHANGE UP (ref 32–36)
MCV RBC AUTO: 94.4 FL — SIGNIFICANT CHANGE UP (ref 80–100)
MONOCYTES # BLD AUTO: 0.37 K/UL — SIGNIFICANT CHANGE UP (ref 0–0.9)
MONOCYTES NFR BLD AUTO: 4.6 % — SIGNIFICANT CHANGE UP (ref 2–14)
NEUTROPHILS # BLD AUTO: 6.13 K/UL — SIGNIFICANT CHANGE UP (ref 1.8–7.4)
NEUTROPHILS NFR BLD AUTO: 75.7 % — SIGNIFICANT CHANGE UP (ref 43–77)
PHOSPHATE SERPL-MCNC: 2.7 MG/DL — SIGNIFICANT CHANGE UP (ref 2.5–4.5)
PLATELET # BLD AUTO: 197 K/UL — SIGNIFICANT CHANGE UP (ref 150–400)
POTASSIUM SERPL-MCNC: 4.2 MMOL/L — SIGNIFICANT CHANGE UP (ref 3.5–5.3)
POTASSIUM SERPL-SCNC: 4.2 MMOL/L — SIGNIFICANT CHANGE UP (ref 3.5–5.3)
PROT ?TM UR-MCNC: 50 MG/DL — HIGH (ref 0–12)
PROT/CREAT UR-RTO: 0.2 RATIO — SIGNIFICANT CHANGE UP (ref 0–0.2)
RBC # BLD: 3.96 M/UL — SIGNIFICANT CHANGE UP (ref 3.8–5.2)
RBC # FLD: 15.3 % — HIGH (ref 10.3–14.5)
SODIUM SERPL-SCNC: 135 MMOL/L — SIGNIFICANT CHANGE UP (ref 135–145)
WBC # BLD: 8.1 K/UL — SIGNIFICANT CHANGE UP (ref 3.8–10.5)
WBC # FLD AUTO: 8.1 K/UL — SIGNIFICANT CHANGE UP (ref 3.8–10.5)

## 2018-01-22 PROCEDURE — 99233 SBSQ HOSP IP/OBS HIGH 50: CPT

## 2018-01-22 PROCEDURE — 99232 SBSQ HOSP IP/OBS MODERATE 35: CPT | Mod: GC

## 2018-01-22 PROCEDURE — 99223 1ST HOSP IP/OBS HIGH 75: CPT | Mod: GC

## 2018-01-22 PROCEDURE — 99223 1ST HOSP IP/OBS HIGH 75: CPT

## 2018-01-22 RX ADMIN — Medication 88 MICROGRAM(S): at 05:40

## 2018-01-22 RX ADMIN — SERTRALINE 50 MILLIGRAM(S): 25 TABLET, FILM COATED ORAL at 12:19

## 2018-01-22 RX ADMIN — Medication 525 MILLIGRAM(S): at 22:06

## 2018-01-22 RX ADMIN — Medication 525 MILLIGRAM(S): at 17:30

## 2018-01-22 RX ADMIN — Medication 10 MILLIGRAM(S): at 05:40

## 2018-01-22 RX ADMIN — ENOXAPARIN SODIUM 40 MILLIGRAM(S): 100 INJECTION SUBCUTANEOUS at 05:40

## 2018-01-22 NOTE — PROGRESS NOTE ADULT - PROBLEM SELECTOR PLAN 2
- no personal or family history of kidney disease, no history of diabetes  - reports darkening in color of urine, no other symptoms  - f/u rheumatologic w/u as above  - given recent weight gain and chronic steroid use, A1C in AM - no personal or family history of kidney disease, no history of diabetes  - reports darkening in color of urine, no other symptoms  - f/u rheumatologic w/u as above  - f/u urine studies - diagnosed in November after complaining of diffuse muscle pains, pt states rheumatologist diagnosed with a blood test but unsure of what it was  - started on steroids at that time, with resolution of muscle pains only after stopping crestor 2 weeks later  - will continue with steroids here as she has chronically been on them pending further rheum w/u, may need higher dose

## 2018-01-22 NOTE — CONSULT NOTE ADULT - SUBJECTIVE AND OBJECTIVE BOX
Patient is a 67y old  Female who presents with a chief complaint of SOB and palpitations (20 Jan 2018 14:58)    HPI:  67F with situs inversus/dextrocardia, PMR on steroids, hyperlipidemia, hypothyroidism presenting with 1 month of progressively worsening shortness of breath, palpitations, weakness and fatigue. In November 2017 she started having diffuse sharp muscle pains throughout her body and bulging of her muscles. She was sent to a rheumatologist and diagnosed with PMR. She had a temporal artery biopsy which was negative for GCA. She was started on prednisone 60mg in the beginning of December and has been tapering down since. She is currently prescribed 30mg. In mid December her muscle pains and bulging did not improve with the steroids so she stopped taking her Crestor as she has had muscle cramps in the past with other statins. After this the pains had resolved and she has not had any since. However, since starting the steroids she has had increasingly worsening SOB where she becomes tachypneic and feels she cannot get a good breath in. This is associated with palpitations. She also reports some dull left sided chest pain when she becomes short of breath that does not radiate. Denies lightheadedness, dizziness or vision changes. She reports a 13-15 pound weight gain in the past 1-2 months after starting steroids. These symptoms have gotten significantly worse since Sunday and she cannot recall when she stopped taking her medications but she does remember that she has not taken them consistently this week. She reports weakness to the point of not being able to get out full sentences and has not had much physical activity.  In the ED, vitals were 142/86, 97.2F, HR 91, 18-93%RA. CTA showed b/l ground glass opacities and UA significant for proteinuria. She was given ceftriaxone/azithro. (20 Jan 2018 14:58)    PAST MEDICAL & SURGICAL HISTORY:  Hyperlipidemia, unspecified hyperlipidemia type  Polymyalgia rheumatica  Depression  Hypothyroidism  Situs inversus/dextrocardia    MEDICATIONS  (STANDING):  enoxaparin Injectable 40 milliGRAM(s) SubCutaneous every 24 hours  levothyroxine 88 MICROGram(s) Oral daily  predniSONE   Tablet 10 milliGRAM(s) Oral daily  sertraline 50 milliGRAM(s) Oral daily    MEDICATIONS  (PRN):  acetaminophen   Tablet. 650 milliGRAM(s) Oral every 6 hours PRN mild or moderate pain    Allergy: Talwin    FAMILY HISTORY:  Family history of COPD (chronic obstructive pulmonary disease) (Mother)  Family history of mixed connective tissue disease (Sibling)  Family history of Raynaud's syndrome (Sibling)  Family history of rheumatoid arthritis (Sibling)      SOCIAL HISTORY: ; lives with mother    CIGARETTES: former smoker    REVIEW OF SYSTEMS  General: See HPI  Respiratory and Thorax: See HPI	  Cardiovascular:	See HPI  Gastrointestinal:	neg  Genitourinary: neg  Musculoskeletal:	 See HPI  Neurological: neg  Psychiatric: depressed  	  Vital Signs Last 24 Hrs  T(C): 36.8 (22 Jan 2018 07:41), Max: 36.9 (21 Jan 2018 16:11)  T(F): 98.3 (22 Jan 2018 07:41), Max: 98.5 (21 Jan 2018 23:55)  HR: 89 (22 Jan 2018 07:41) (89 - 92)  BP: 124/74 (22 Jan 2018 07:41) (107/69 - 124/74)  BP(mean): --  RR: 18 (22 Jan 2018 07:41) (18 - 20)  SpO2: 97% (22 Jan 2018 07:41) (91% - 97%)    PHYSICAL EXAM:    Constitutional: WD, WN in NAD  Neck: no JVD noted  Respiratory: clear lungs  Cardiovascular: RRR, dextrocardia, no R/G/M  Gastrointestinal: BS present, no masses, NT  Extremities: no edema, mild tenderness of muscles to palpation  Vascular: nl pulses  Neurological: grossly nonfocal  Skin: no rashes noted  Lymph Nodes: no appreciable adenopathy  Musculoskeletal: no joint swelling noted  Psychiatric: depressed, flat affect    TELEMETRY:    ECG:< from: 12 Lead ECG (01.20.18 @ 06:30) >  Ventricular Rate 83 BPM    Atrial Rate 83 BPM    P-R Interval 168 ms    QRS Duration 68 ms     ms    QTc 446 ms    R Axis 130 degrees    T Axis 63 degrees    Diagnosis Line *** SUSPECT ARM LEAD REVERSAL, INTERPRETATION ASSUMES NO REVERSAL  NORMAL SINUS RHYTHM  ANTEROLATERAL INFARCT , AGE UNDETERMINED  ABNORMAL ECG    < end of copied text >        LABS:                        13.1   7.27  )-----------( 198      ( 21 Jan 2018 08:43 )             38.7     01-21    135  |  101  |  15  ----------------------------<  127<H>  3.8   |  22  |  0.81    Ca    8.9      21 Jan 2018 08:48  Mg     1.8     01-21    ESR- 44        Urinalysis Basic - ( 20 Jan 2018 12:25 )    Color: Yellow / Appearance: Clear / SG: >1.030 / pH: x  Gluc: x / Ketone: Trace  / Bili: Negative / Urobili: 4 mg/dL   Blood: x / Protein: 100 mg/dL / Nitrite: Negative   Leuk Esterase: Negative / RBC: 3-5 /HPF / WBC 3-5 /HPF   Sq Epi: x / Non Sq Epi: OCC /HPF / Bacteria: x      I&O's Summary    21 Jan 2018 07:01  -  22 Jan 2018 07:00  --------------------------------------------------------  IN: 720 mL / OUT: 950 mL / NET: -230 mL    22 Jan 2018 07:01  -  22 Jan 2018 10:08  --------------------------------------------------------  IN: 240 mL / OUT: 0 mL / NET: 240 mL      BNP  RADIOLOGY & ADDITIONAL STUDIES:  < from: Xray Chest 2 Views PA/Lat (01.20.18 @ 05:57) >  EXAM:  XR CHEST PA LAT 2V                            PROCEDURE DATE:  01/20/2018            INTERPRETATION:  CLINICAL INDICATION: 2 weeks of worsening dyspnea. Known   situs inversus with tachycardia.    TECHNIQUE: Frontal and Lateral views of the chest    COMPARISON: None available.    FINDINGS:     There is a right-sided heart and aortic arch.    No focal consolidation.    No pleural effusion or pneumothorax.    IMPRESSION: Dextrocardia with a right-sided aortic arch. No focal   consolidation.                MICHELLE HERNANDEZ M.D., RADIOLOGY RESIDENT  This document has been electronically signed.  RENATE SCHAFER M.D., ATTENDING RADIOLOGIST  This document has been electronically signed. Jan 20 2018  8:14AM    < end of copied text >  < from: CT Angio Chest w/ IV Cont (01.20.18 @ 08:50) >  EXAM:  CT ANGIO CHEST (W)AW IC                            PROCEDURE DATE:  01/20/2018            INTERPRETATION:  CLINICAL INFORMATION: Shortness of breath.    COMPARISON: Correlation is made with chest x-ray dated 1/20/2018 at 5:51   AM.    PROCEDURE:   CTA of the Chest was performed with intravenous contrast.  90 ml of Omnipaque 350 was injected intravenously. 10 ml were discarded.  Sagittal and coronal reformats were performed as well as 3D Mini MIPs.      FINDINGS:    CHEST:     LUNGS AND LARGE AIRWAYS: Patent central airways. Scattered bilateral   perihilar groundglass opacities, with upper lobe predominance and   interlobular septal thickening.  PLEURA: No pleural effusion.  VESSELS: No pulmonary embolism.  HEART: Dextrocardia. Right aortic arch. No pericardial effusion.  MEDIASTINUM AND NEEL: Less than 1 cm bilateral hilar lymph nodes.  CHEST WALL AND LOWER NECK: Thyroid gland is diminutive.  VISUALIZED UPPER ABDOMEN: Situs inversus. Stenosis of the proximal celiac   artery.  BONES: Mild degenerative changes in the spine.    IMPRESSION:   No pulmonary embolism.    Scattered bilateral perihilar groundglass opacities with interlobular   septal thickening. The differential diagnosis includes  mild pulmonary   edema and pneumonia.    Situs inversus.    Stenosis of the proximal celiac artery.                    SIOBHAN BABIN M.D., RADIOLOGY RESIDENT  This document has been electronically signed.  MICK KAPOOR M.D., ATTENDING RADIOLOGIST  This document has been electronically signed. Jan 20 2018 10:21AM        < end of copied text >    Echo done in office on 11/16/17 with nl EF, dextrocardia, mild MR/TR and mild LV diastolic dysfunction ( see report in chart)    IMPRESSION:  SOB- etiology possibly inflammatory or infectious   HLD  Hypothyroidism  Situs inversus with dextrocardia    RECOMMENDATIONS:  Continue current meds  Rheumatology f/u regarding possible PMR or other inflammatory cause of pulm findings  Suggest pulm consult with Dr. Pascual due to SOB and findings on CT chest  F/u labs

## 2018-01-22 NOTE — CONSULT NOTE ADULT - SUBJECTIVE AND OBJECTIVE BOX
HPI:  67F with situs inversus,  hyperlipidemia, hypothyroidism, was started on Crestor recently and developed muscle pain 11/2017and was seen by rheum, was considered PMH and giant cell arthrits in view of scalp tenderness, so was started on prednisone 60 and underwent temporal artery biopsy which was negative. The rheumatologist started to taper the prednisone but patient believes when she discontinued the Crestor her muscle pain resolved. 2 weeks into steroids she started to develop dyspnea with palpitation and fatigue. She denied fever, chills, cough, weight loss but she had altered mental status with a change in her a smelling, bleeding from the nose and night sweats.  She was born in [place] no recent travel, last travel was 2000 to [place], lives with her mother, has cats and used to had birds ( in parrots family) before.  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  She has strong family history of auto immune disorders like RA, mixed connective tissue disorder or Rheynaud      PAST MEDICAL & SURGICAL HISTORY:  Hyperlipidemia, unspecified hyperlipidemia type  Polymyalgia rheumatica  Depression  Hypothyroid      Allergies    Vitamin E (Hives)    Intolerances    Artifical Cherry Flavoring (Nausea)  narcotic analgesics (Unknown)      ANTIMICROBIALS:  trimethoprim / sulfamethoxazole IVPB    trimethoprim / sulfamethoxazole IVPB 400 once  trimethoprim / sulfamethoxazole IVPB 400 every 8 hours      OTHER MEDS:  acetaminophen   Tablet. 650 milliGRAM(s) Oral every 6 hours PRN  enoxaparin Injectable 40 milliGRAM(s) SubCutaneous every 24 hours  levothyroxine 88 MICROGram(s) Oral daily  predniSONE   Tablet 10 milliGRAM(s) Oral daily  sertraline 50 milliGRAM(s) Oral daily      SOCIAL HISTORY:    Marital Status:    Occupation: retired  Lives with: mother    Substance Use (street drugs): (x  ) never used    Tobacco Usage:   never smoked     Alcohol Usage: no    FAMILY HISTORY:  Family history of COPD (chronic obstructive pulmonary disease) (Mother)  Family history of mixed connective tissue disease (Sibling)  Family history of Raynaud's syndrome (Sibling)  Family history of rheumatoid arthritis (Sibling)      ROS:    All other systems negative     Constitutional: no fever, no chills, no weight loss, + night sweats  HEENT:  no vision changes, + bleeding from the nose, impaired sense of smell  Cardiovascular:  no chest pain, + palpitation  Respiratory:  + SOB, no cough  GI:  no abd pain, no vomiting, no diarrhea  urinary: no dysuria, no hematuria, no flank pain  : no vaginal  discharge or bleeding  musculoskeletal:  no joint pain, no joint swelling  skin:  no rash  neurology:  no headache, no seizure, + change in mental status  psych: no anxiety, no depression     Physical Exam:    General:    NAD, non toxic, A&O x3  HEENT:   no oropharyngeal lesions, no LAD, neck supple  Cardio:    regular S1,S2, no murmur, dextrecardia  Respiratory:   clear b/l, no wheezing  abd:   soft, BS +, not tender, no hepatosplenomegaly  :     no CVAT, no spurapubic tenderness, no bowser  Musculoskeletal : no joint swelling, no edema  Skin:    no rash  vascular: no lines, normal pulses  Neurologic:     no focal deficits  psych: normal affect, no suicidal ideation      Drug Dosing Weight  Height (cm): 165.1 (20 Jan 2018 22:40)  Weight (kg): 77.5 (20 Jan 2018 22:40)  BMI (kg/m2): 28.4 (20 Jan 2018 22:40)  BSA (m2): 1.85 (20 Jan 2018 22:40)    Vital Signs Last 24 Hrs  T(F): 98.3 (01-22-18 @ 07:41), Max: 100.4 (01-20-18 @ 16:33)    Vital Signs Last 24 Hrs  HR: 89 (01-22-18 @ 07:41) (89 - 91)  BP: 124/74 (01-22-18 @ 07:41) (112/73 - 124/74)  RR: 18 (01-22-18 @ 07:41)  SpO2: 88% (01-22-18 @ 10:34) (88% - 97%)  Wt(kg): --                          12.5   8.10  )-----------( 197      ( 22 Jan 2018 10:29 )             37.4       01-22    135  |  100  |  15  ----------------------------<  123<H>  4.2   |  21<L>  |  0.89    Ca    9.4      22 Jan 2018 10:29  Phos  2.7     01-22  Mg     2.1     01-22            MICROBIOLOGY:  v      Rapid RVP Result: Tatiannatec (01-20 @ 14:40)          RADIOLOGY:    < from: CT Angio Chest w/ IV Cont (01.20.18 @ 08:50) >  IMPRESSION:   No pulmonary embolism.    Scattered bilateral perihilar groundglass opacities with interlobular   septal thickening. The differential diagnosis includes  mild pulmonary   edema and pneumonia.    Situs inversus.    Stenosis of the proximal celiac artery.

## 2018-01-22 NOTE — PROGRESS NOTE ADULT - SUBJECTIVE AND OBJECTIVE BOX
JOHN GAMEZ  [id number]    INTERVAL HPI/OVERNIGHT EVENTS:  No acute events overnight. Pt reports feeling significantly better today in terms of her oxygenation and cognition.  Feels that she is less confused because her body is getting more oxygen.  Denies any joint pains, fevers, chills.     MEDICATIONS  (STANDING):  enoxaparin Injectable 40 milliGRAM(s) SubCutaneous every 24 hours  levothyroxine 88 MICROGram(s) Oral daily  predniSONE   Tablet 10 milliGRAM(s) Oral daily  sertraline 50 milliGRAM(s) Oral daily    MEDICATIONS  (PRN):  acetaminophen   Tablet. 650 milliGRAM(s) Oral every 6 hours PRN mild or moderate pain      Allergies    Vitamin E (Hives)    Intolerances    Artifical Cherry Flavoring (Nausea)  narcotic analgesics (Unknown)      Review of Systems:   General: No fevers/chills, no fatigue  CVS: No CP/palpitations  Resp: +YATES  GI: No N/V/C/D/abdominal pain  MSK: No joint pains  Skin: No new rashes  Neuro: No headaches      Vital Signs Last 24 Hrs  T(C): 36.8 (22 Jan 2018 07:41), Max: 36.9 (21 Jan 2018 16:11)  T(F): 98.3 (22 Jan 2018 07:41), Max: 98.5 (21 Jan 2018 23:55)  HR: 89 (22 Jan 2018 07:41) (89 - 92)  BP: 124/74 (22 Jan 2018 07:41) (107/69 - 124/74)  BP(mean): --  RR: 18 (22 Jan 2018 07:41) (18 - 20)  SpO2: 88% (22 Jan 2018 10:34) (88% - 97%)    Physical Exam:  General: NAD  HEENT: EOMI, MMM  Cardio: +S1/S2, RRR  Resp: CTA b/l  GI: +BS, soft, NT/ND  MSK: No major noted joint abnormalities  Neuro: AAOx3  Psych: wnl    LABS:                        12.5   8.10  )-----------( 197      ( 22 Jan 2018 10:29 )             37.4     01-22    135  |  100  |  15  ----------------------------<  123<H>  4.2   |  21<L>  |  0.89    Ca    9.4      22 Jan 2018 10:29  Phos  2.7     01-22  Mg     2.1     01-22    Sedimentation Rate, Erythrocyte (01.22.18 @ 09:13)    Sedimentation Rate, Erythrocyte: 44 mm/hr          RADIOLOGY & ADDITIONAL TESTS:

## 2018-01-22 NOTE — CONSULT NOTE ADULT - SUBJECTIVE AND OBJECTIVE BOX
CHIEF COMPLAINT:  Shortness of breath    HPI:  Patient is a 66 y/o female with situs inversus,  hyperlipidemia, hypothyroidism, started recently on Crestor recently and developed muscle pain 11/2017 and general weakness few months after being on crestor and was seen by PMD who after some bloodwork and initial eval, referred to rheum. She was considered to have polymyalgia rheumatica and giant cell arthritis in view of scalp tenderness, so was started on prednisone 60mg daily and underwent temporal artery biopsy which was negative. Her symptoms did not improve on steroids, so she discontinued crestor by herself with some improvement in her symptoms. Her steroids were being tapered by rheum and she was last on prednisone 30 mg daily.  About 3-4 weeks after being on steroids she started to develop dyspnea with palpitation and fatigue which gradually got worse over last month. She did not had associated cough, phlegm production or fever. At baseline she did not had any trouble with her breathing and could walk up 2 flight of stairs without any problem. She does not have any chronic lung diseases or recurrent lung infections.   last tuesday patient has sudden worsening of her symptoms with sob, diffuse body pain after which she does not remember taking medications. She was brought to hospital on 01/20/2018 for shortness of breath. In ER CTPA was done which was negative for PE but showed bilateral ground glass opacities. She was given one dose of ceftriaxione/ azithro and admitted to hospital. She was started on bactrim for possible PCP infection. Workup so far include negative RCP, cANCA, pANCA, RF and has normal complement levels    PAST MEDICAL & SURGICAL HISTORY:  Hyperlipidemia, unspecified hyperlipidemia type  Polymyalgia rheumatica  Depression  Hypothyroid      FAMILY HISTORY:  Family history of COPD (chronic obstructive pulmonary disease) (Mother)  Family history of mixed connective tissue disease (Sibling) - sister  Family history of Raynaud's syndrome (Sibling) - sister  Family history of rheumatoid arthritis (Sibling) - sister    SOCIAL HISTORY:  Smoking: [ ] Never Smoked [x] Former Smoker quit 40 yrs ago and used to smoke 1-2 cig/over weekends as teenager[ ] Current Smoker  (__ packs x ___ years)  Substance Use: [x] Never Used [ ] Used ____  EtOH Use: denied  Marital Status: [ ] Single [ ]  [x]  [ ]   Occupation: used to work as   Recent Travel: last traveled to [facility] in 2000, no recent travel  Country of Birth: [facility]  Advance Directives: FULLCODE    Allergies    Vitamin E (Hives)    Intolerances    Artifical Cherry Flavoring (Nausea)  narcotic analgesics (Unknown)      HOME MEDICATIONS:  Home Medications:  predniSONE 1 mg oral tablet: 3 tab(s) orally once a day (20 Jan 2018 15:22)  Synthroid 88 mcg (0.088 mg) oral tablet: 1 tab(s) orally once a day (20 Jan 2018 15:22)  Zoloft 50 mg oral tablet: 1 tab(s) orally once a day (20 Jan 2018 15:22)      REVIEW OF SYSTEMS:  Constitutional: [ ] negative [ ] fevers [ ] chills [x] weight loss [ ] weight gain  HEENT: [x] negative [ ] dry eyes [ ] eye irritation [ ] postnasal drip [ ] nasal congestion  CV: [ ] negative  [ x] chest pain [ x] orthopnea [x] palpitations [ ] murmur  Resp: [ ] negative [ ] cough [x ] shortness of breath [x ] dyspnea [ ] wheezing [ ] sputum [ ] hemoptysis  GI: [x ] negative [ ] nausea [ ] vomiting [ ] diarrhea [ ] constipation [ ] abd pain [ ] dysphagia   : [x ] negative [ ] dysuria [ ] nocturia [ ] hematuria [ ] increased urinary frequency  Musculoskeletal: [ ] negative [ ] back pain [x ] myalgias [ ] arthralgias [ ] fracture  Skin: [ x] negative [ ] rash [ ] itch  Neurological: [x ] negative [ ] headache [ ] dizziness [ ] syncope [ ] weakness [ ] numbness  Psychiatric: [ ] negative [ ] anxiety [ x] depression  Endocrine: [x ] negative [ ] diabetes [ ] thyroid problem  Hematologic/Lymphatic: [x ] negative [ ] anemia [ ] bleeding problem  Allergic/Immunologic: [x ] negative [ ] itchy eyes [ ] nasal discharge [ ] hives [ ] angioedema  [ ] All other systems negative  [ ] Unable to assess ROS because ________    OBJECTIVE:  ICU Vital Signs Last 24 Hrs  T(C): 36.8 (22 Jan 2018 07:41), Max: 36.9 (21 Jan 2018 23:55)  T(F): 98.3 (22 Jan 2018 07:41), Max: 98.5 (21 Jan 2018 23:55)  HR: 89 (22 Jan 2018 07:41) (89 - 91)  BP: 124/74 (22 Jan 2018 07:41) (112/73 - 124/74)  RR: 18 (22 Jan 2018 07:41) (18 - 20)  SpO2: 88% (22 Jan 2018 10:34) (88% - 97%)        01-21 @ 07:01 - 01-22 @ 07:00  --------------------------------------------------------  IN: 720 mL / OUT: 950 mL / NET: -230 mL    01-22 @ 07:01 - 01-22 @ 17:46  --------------------------------------------------------  IN: 500 mL / OUT: 600 mL / NET: -100 mL      CAPILLARY BLOOD GLUCOSE      PHYSICAL EXAM:  General:    NAD, non toxic, A&O x3  HEENT:   no oropharyngeal lesions, no LAD, neck supple, no pallor  Cardio:    regular S1,S2, no murmur, dextrocardia  Respiratory:   b/l air entry equal, no wheezing, no crepts  abd:   soft, BS +, not tender, no hepatosplenomegaly  Musculoskeletal : no joint swelling, no edema  Skin:    no rash  Neurologic:     no focal deficits, sensory and motor exam grossly intact  psych: normal affect, no suicidal ideation      HOSPITAL MEDICATIONS:  Standing Meds:  enoxaparin Injectable 40 milliGRAM(s) SubCutaneous every 24 hours  levothyroxine 88 MICROGram(s) Oral daily  predniSONE   Tablet 10 milliGRAM(s) Oral daily  sertraline 50 milliGRAM(s) Oral daily  trimethoprim / sulfamethoxazole IVPB      trimethoprim / sulfamethoxazole IVPB 400 milliGRAM(s) IV Intermittent every 8 hours      PRN Meds:  acetaminophen   Tablet. 650 milliGRAM(s) Oral every 6 hours PRN      LABS:                        12.5   8.10  )-----------( 197      ( 22 Jan 2018 10:29 )             37.4     Hgb Trend: 12.5<--, 13.1<--, 13.9<--  01-22    135  |  100  |  15  ----------------------------<  123<H>  4.2   |  21<L>  |  0.89    Ca    9.4      22 Jan 2018 10:29  Phos  2.7     01-22  Mg     2.1     01-22      Creatinine Trend: 0.89<--, 0.81<--, 0.87<--            MICROBIOLOGY:       RADIOLOGY:  [ ] Reviewed and interpreted by me    CT angio CHEST  No pulmonary embolism.   Scattered bilateral perihilar groundglass opacities with interlobular septal  thickening. The differential diagnosis includes mild pulmonary edema and  pneumonia.   Situs inversus.   Stenosis of the proximal celiac artery.     PULMONARY FUNCTION TESTS:  none  EKG:  sinus tachycardia

## 2018-01-22 NOTE — PROGRESS NOTE ADULT - PROBLEM SELECTOR PLAN 1
- progressively worsening SOB with palpitations, weakness and fatigue for 1 month, differential includes infectious vs rheumatologic (vasculitis, connective tissue disease) vs. interstitial lung disease  - saturating 93-97% on RA, not tachypneic however is tachycardic  - EKG showed sinus tachycardia  - CTA as above, b/l ground glass opacities, negative for PE  - no evidence of fluid overload, ischemic changes on EKG, troponins neg x2, unlikely cardiac  - afebrile, no leukocytosis, s/p ceftriaxone/azithro in ED, unlikely infectious will hold off on ab for now  - unable to contact rheumatologist for outpt w/u, will resend rheumatologic w/u here given CT findings and family history  - f/u ANCA with reflex MPO and P3, OLEKSANDR, RF, C3, C4, antiGBM  - saturating well on RA, supp O2 if needed - progressively worsening SOB with palpitations, weakness and fatigue for 1 month, differential includes infectious(?PCP) vs rheumatologic (vasculitis, connective tissue disease) vs. interstitial lung disease  - saturating 93-97% on RA, not tachypneic however is tachycardic  - EKG showed sinus tachycardia  - CTA w/ b/l ground glass opacities, negative for PE  - no evidence of fluid overload, ischemic changes on EKG, troponins neg x2, unlikely cardiac  - afebrile, no leukocytosis, s/p ceftriaxone/azithro in ED, unlikely infectious will hold off on ab for now  - will contact outpt rheum today for past w/u  - RF negative  - Rheum consulted - f/u ANCA with reflex MPO and P3, OLEKSANDR, C3, C4, antiGBM, concern for PCP given 1 month history of 20-30mg prednisone daily  - saturating well on RA, supp O2 if needed Hypoxemic at 88% off O2 while ambulating per RN today.  - progressively worsening SOB with palpitations, weakness and fatigue for 1 month, differential includes infectious(?PCP) vs rheumatologic (vasculitis, connective tissue disease) vs. interstitial lung disease

## 2018-01-22 NOTE — PROGRESS NOTE ADULT - SUBJECTIVE AND OBJECTIVE BOX
Patient is a 67y old  Female who presents with a chief complaint of SOB, palpitations (20 Jan 2018 14:58)      SUBJECTIVE / OVERNIGHT EVENTS:    MEDICATIONS  (STANDING):  enoxaparin Injectable 40 milliGRAM(s) SubCutaneous every 24 hours  levothyroxine 88 MICROGram(s) Oral daily  predniSONE   Tablet 10 milliGRAM(s) Oral daily  sertraline 50 milliGRAM(s) Oral daily    MEDICATIONS  (PRN):  acetaminophen   Tablet. 650 milliGRAM(s) Oral every 6 hours PRN mild or moderate pain        CAPILLARY BLOOD GLUCOSE        I&O's Summary    21 Jan 2018 07:01  -  22 Jan 2018 07:00  --------------------------------------------------------  IN: 720 mL / OUT: 950 mL / NET: -230 mL        PHYSICAL EXAM:  GENERAL: NAD, well-developed  HEAD:  Atraumatic, Normocephalic  EYES: EOMI, PERRLA, conjunctiva and sclera clear  NECK: Supple, No JVD  CHEST/LUNG: Clear to auscultation bilaterally; No wheeze  HEART: Regular rate and rhythm; No murmurs, rubs, or gallops  ABDOMEN: Soft, Nontender, Nondistended; Bowel sounds present  EXTREMITIES:  2+ Peripheral Pulses, No clubbing, cyanosis, or edema  PSYCH: AAOx3  NEUROLOGY: non-focal  SKIN: No rashes or lesions    LABS:  (01-21 @ 08:43)                        13.1  7.27 )-----------( 198                 38.7    Neutrophils = -- (--%)  Lymphocytes = -- (--%)  Eosinophils = -- (--%)  Basophils = -- (--%)  Monocytes = -- (--%)  Bands = --%    WBC Trend: 7.27<--, 8.2<--  Hb Trend: 13.1<--, 13.9<--  Plt Trend: 198<--, 180<--  01-21    135  |  101  |  15  ----------------------------<  127<H>  3.8   |  22  |  0.81    Ca    8.9      21 Jan 2018 08:48  Mg     1.8     01-21      Creatinine Trend: 0.81<--, 0.87<--    CARDIAC MARKERS ( 20 Jan 2018 10:00 )  Trop <0.01 ng/mL / CK x     / CKMB x           Urinalysis Basic - ( 20 Jan 2018 12:25 )    Color: Yellow / Appearance: Clear / SG: >1.030 / pH: x  Gluc: x / Ketone: Trace  / Bili: Negative / Urobili: 4 mg/dL   Blood: x / Protein: 100 mg/dL / Nitrite: Negative   Leuk Esterase: Negative / RBC: 3-5 /HPF / WBC 3-5 /HPF   Sq Epi: x / Non Sq Epi: OCC /HPF / Bacteria: x        Microbiology:  Urine Cx:  Blood Cx:    RADIOLOGY & ADDITIONAL TESTS:  X- Ray:  CT:  Ultrasound:  [ ] imaging personally reviewed and interpreted by me    Consultant(s) Notes Reviewed:      Care Discussed with Consultants/Other Providers: Patient is a 67y old  Female who presents with a chief complaint of SOB, palpitations (20 Jan 2018 14:58)      SUBJECTIVE / OVERNIGHT EVENTS: No acute events overnight. Patient states she feels more alert and strong today. Tearful this AM about her symptoms and being in the hospital. Denies HA, dizziness, palpitations. Continued SOB. No N/V, abdominal pain.     MEDICATIONS  (STANDING):  enoxaparin Injectable 40 milliGRAM(s) SubCutaneous every 24 hours  levothyroxine 88 MICROGram(s) Oral daily  predniSONE   Tablet 10 milliGRAM(s) Oral daily  sertraline 50 milliGRAM(s) Oral daily    MEDICATIONS  (PRN):  acetaminophen   Tablet. 650 milliGRAM(s) Oral every 6 hours PRN mild or moderate pain        CAPILLARY BLOOD GLUCOSE        I&O's Summary    21 Jan 2018 07:01  -  22 Jan 2018 07:00  --------------------------------------------------------  IN: 720 mL / OUT: 950 mL / NET: -230 mL        PHYSICAL EXAM:  Vital Signs Last 24 Hrs  T(C): 36.8 (22 Jan 2018 07:41), Max: 36.9 (21 Jan 2018 16:11)  T(F): 98.3 (22 Jan 2018 07:41), Max: 98.5 (21 Jan 2018 23:55)  HR: 89 (22 Jan 2018 07:41) (89 - 92)  BP: 124/74 (22 Jan 2018 07:41) (107/69 - 124/74)  BP(mean): --  RR: 18 (22 Jan 2018 07:41) (18 - 20)  SpO2: 88% (22 Jan 2018 10:34) (88% - 97%)    GENERAL: NAD, well-developed  HEAD:  Atraumatic, Normocephalic  EYES: EOMI, PERRLA, conjunctiva and sclera clear  NECK: Supple, No JVD  CHEST/LUNG: Clear to auscultation bilaterally; No wheeze  HEART: Regular rate and rhythm; No murmurs, rubs, or gallops  ABDOMEN: Soft, Nontender, Nondistended; Bowel sounds present  BACK: ttp of paraspinal muscles in thoracic region, ttp at SI joints, no tenderness on spine  EXTREMITIES:  2+ Peripheral Pulses, No clubbing, cyanosis, or edema, tenderness to palpation of b/l calves  PSYCH: AAOx3  NEUROLOGY: non-focal  SKIN: No rashes or lesions    LABS:  (01-21 @ 08:43)                        13.1  7.27 )-----------( 198                 38.7    Neutrophils = -- (--%)  Lymphocytes = -- (--%)  Eosinophils = -- (--%)  Basophils = -- (--%)  Monocytes = -- (--%)  Bands = --%    WBC Trend: 7.27<--, 8.2<--  Hb Trend: 13.1<--, 13.9<--  Plt Trend: 198<--, 180<--  01-21    135  |  101  |  15  ----------------------------<  127<H>  3.8   |  22  |  0.81    Ca    8.9      21 Jan 2018 08:48  Mg     1.8     01-21      Creatinine Trend: 0.81<--, 0.87<--    CARDIAC MARKERS ( 20 Jan 2018 10:00 )  Trop <0.01 ng/mL / CK x     / CKMB x           Urinalysis Basic - ( 20 Jan 2018 12:25 )    Color: Yellow / Appearance: Clear / SG: >1.030 / pH: x  Gluc: x / Ketone: Trace  / Bili: Negative / Urobili: 4 mg/dL   Blood: x / Protein: 100 mg/dL / Nitrite: Negative   Leuk Esterase: Negative / RBC: 3-5 /HPF / WBC 3-5 /HPF   Sq Epi: x / Non Sq Epi: OCC /HPF / Bacteria: x        Microbiology:  Urine Cx:  Blood Cx:    RADIOLOGY & ADDITIONAL TESTS:  X- Ray:  CT:  Ultrasound:  [ ] imaging personally reviewed and interpreted by me    Consultant(s) Notes Reviewed:      Care Discussed with Consultants/Other Providers:

## 2018-01-22 NOTE — PROGRESS NOTE ADULT - PROBLEM SELECTOR PLAN 4
- diagnosed in November after complaining of diffuse muscle pains, pt states rheumatologist diagnosed with a blood test but unsure of what it was  - started on steroids at that time, with resolution of muscle pains only after stopping crestor 2 weeks later  - will continue with steroids here as she has chronically been on them pending further rheum w/u, may need higher dose - no personal or family history of kidney disease, no history of diabetes  - reports darkening in color of urine, no other symptoms  - f/u rheumatologic w/u as above  - f/u urine studies

## 2018-01-22 NOTE — PROGRESS NOTE ADULT - ASSESSMENT
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 Pending rheumatologic w/u.

## 2018-01-22 NOTE — PROGRESS NOTE ADULT - PROBLEM SELECTOR PLAN 6
DVT: lovenox - new onset with A1C here 6.7  - could be 2/2 prednisone use  - will start low dose ISS here and consistent carb diet

## 2018-01-22 NOTE — PROGRESS NOTE ADULT - ASSESSMENT
68 yo F with recent dx of PMR on chronic steroids since Nov, now p/w severe SOB with saturation around 93-95% on NC  Pt is feeling symptomatically better since being back on steroids w/ improved SOB.     Concern for infectious etiology (PCP) vs autoimmune etiology (?AAV with hx of nasal crusting) although unusual to develop vasculitis or pneumonitis on high dose steroids which the patient has been on since late Nov 2017. Less likely PMR given history and that she has been on high doses of prednisone.      *recs pending d/w attending 66 yo F with recent dx of PMR on chronic steroids since Nov, now p/w severe SOB with saturation around 93-95% on NC  Pt is feeling symptomatically better since being back on steroids w/ improved SOB.     Concern for infectious etiology (PCP as pt has been on steroids) vs autoimmune etiology (?AAV with hx of nasal crusting) although unusual to develop vasculitis or pneumonitis on high dose steroids which the patient has been on since late Nov 2017. Less likely PMR given history and that she has been on high doses of prednisone.        - Would rec f/u pulm and ID eval for further management for now  - c/w Pred 10mg for now    will follow

## 2018-01-22 NOTE — CONSULT NOTE ADULT - ATTENDING COMMENTS
Agree with above. Hypoxemic respiratory failure with scatter ground glass opacities seen on CT chest. Etiology unclear but PCP is a high possibility given high dose of steroids she was on. Less likely to be vasculitis and symptoms developed while she was on steroids. She was empirically started on treatment for PCP. Would favor bronchoscopy with biopsy to better elucidate. Risks and benefits of procedure discussed with patient. Plan for bronchoscopy on 1/24.
Patient seen and examined at bedside. Please call [phone] for any questions or concerns

## 2018-01-22 NOTE — CONSULT NOTE ADULT - ASSESSMENT
67F with situs inversus,  hyperlipidemia, hypothyroidism, was started on Crestor recently and developed muscle pain 11/2017and was seen by rheum, was considered PMH and giant cell arthritis in view of scalp tenderness, so was started on prednisone 60 and underwent temporal artery biopsy which was negative. The rheumatologist started to taper the prednisone but patient believes when she discontinued the Crestor her muscle pain resolved. 2 weeks into steroids she started to develop dyspnea with palpitation and fatigue. She denied fever, chills, cough, weight loss but she had altered mental status with a change in her a smelling, bleeding from the nose and night sweats.  She was born in [place] no recent travel, last travel was 2000 to [place], lives with her mother, has cats and used to had birds ( in parrots family) before.  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  She has strong family history of auto immune disorders like RA, mixed connective tissue disorder or Rheynaud    dyspnea with hypoxia, b/l groundglass opacities, epistaxis, ? AMS with impairment in taste and smell, ? recent DX of PML, giant cell arthritis with negative temporal artery BX on tapering steroids  strong family history of auto immune disorders, used to have birds, from [place]  auto immune like Wegener's vs infectious like PCP, fungal  imagings were discussed with radiology which stated that PCP is more likely in the picture    start bactrim 15 mg/kg divided to q8  pulm eval for bronch and biopsy  brain MRI  check urine histo Ag and serum Ab  check galactomannan  f/u rheum w/u

## 2018-01-22 NOTE — CONSULT NOTE ADULT - ASSESSMENT
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  Differential for ground glass opacities is broad and includes infective, rheumatological, etc. Considering patient was on high dose steroids PCP is very likely -  would agree on continuing bactrim for treatment. Also, would recommend getting room air ABG to evaluate for hypoxia. Also, as patient is hypoxic with possible PCP infection it would be recommended to give prednisone 40 mg BID for 5 days, then 40 mg daily for 5 days, followed by 20 mg daily.  Plan would be to possibly undergo bronchoscopy for further evaluation - possibly on wenesday

## 2018-01-23 LAB
ANA PAT FLD IF-IMP: ABNORMAL
ANA TITR SER: ABNORMAL
ANION GAP SERPL CALC-SCNC: 18 MMOL/L — HIGH (ref 5–17)
BUN SERPL-MCNC: 13 MG/DL — SIGNIFICANT CHANGE UP (ref 7–23)
CALCIUM SERPL-MCNC: 8.3 MG/DL — LOW (ref 8.4–10.5)
CHLORIDE SERPL-SCNC: 99 MMOL/L — SIGNIFICANT CHANGE UP (ref 96–108)
CO2 SERPL-SCNC: 19 MMOL/L — LOW (ref 22–31)
CREAT SERPL-MCNC: 0.96 MG/DL — SIGNIFICANT CHANGE UP (ref 0.5–1.3)
GAS PNL BLDA: SIGNIFICANT CHANGE UP
GAS PNL BLDA: SIGNIFICANT CHANGE UP
GLUCOSE BLDC GLUCOMTR-MCNC: 127 MG/DL — HIGH (ref 70–99)
GLUCOSE BLDC GLUCOMTR-MCNC: 164 MG/DL — HIGH (ref 70–99)
GLUCOSE SERPL-MCNC: 226 MG/DL — HIGH (ref 70–99)
HCT VFR BLD CALC: 33.6 % — LOW (ref 34.5–45)
HGB BLD-MCNC: 11.3 G/DL — LOW (ref 11.5–15.5)
MCHC RBC-ENTMCNC: 31.6 PG — SIGNIFICANT CHANGE UP (ref 27–34)
MCHC RBC-ENTMCNC: 33.6 GM/DL — SIGNIFICANT CHANGE UP (ref 32–36)
MCV RBC AUTO: 93.9 FL — SIGNIFICANT CHANGE UP (ref 80–100)
MYELOPEROXIDASE AB SER-ACNC: 6.2 UNITS — SIGNIFICANT CHANGE UP
MYELOPEROXIDASE CELLS FLD QL: NEGATIVE — SIGNIFICANT CHANGE UP
PLATELET # BLD AUTO: 159 K/UL — SIGNIFICANT CHANGE UP (ref 150–400)
POTASSIUM SERPL-MCNC: 3.7 MMOL/L — SIGNIFICANT CHANGE UP (ref 3.5–5.3)
POTASSIUM SERPL-SCNC: 3.7 MMOL/L — SIGNIFICANT CHANGE UP (ref 3.5–5.3)
PROTEINASE3 AB FLD-ACNC: <5 UNITS — SIGNIFICANT CHANGE UP
PROTEINASE3 AB SER-ACNC: NEGATIVE — SIGNIFICANT CHANGE UP
RBC # BLD: 3.58 M/UL — LOW (ref 3.8–5.2)
RBC # FLD: 15.3 % — HIGH (ref 10.3–14.5)
SODIUM SERPL-SCNC: 136 MMOL/L — SIGNIFICANT CHANGE UP (ref 135–145)
WBC # BLD: 7.96 K/UL — SIGNIFICANT CHANGE UP (ref 3.8–10.5)
WBC # FLD AUTO: 7.96 K/UL — SIGNIFICANT CHANGE UP (ref 3.8–10.5)

## 2018-01-23 PROCEDURE — 93010 ELECTROCARDIOGRAM REPORT: CPT

## 2018-01-23 PROCEDURE — 99233 SBSQ HOSP IP/OBS HIGH 50: CPT | Mod: GC

## 2018-01-23 PROCEDURE — 99233 SBSQ HOSP IP/OBS HIGH 50: CPT

## 2018-01-23 PROCEDURE — 70553 MRI BRAIN STEM W/O & W/DYE: CPT | Mod: 26

## 2018-01-23 RX ORDER — ONDANSETRON 8 MG/1
4 TABLET, FILM COATED ORAL ONCE
Qty: 0 | Refills: 0 | Status: COMPLETED | OUTPATIENT
Start: 2018-01-23 | End: 2018-01-23

## 2018-01-23 RX ORDER — LANOLIN ALCOHOL/MO/W.PET/CERES
5 CREAM (GRAM) TOPICAL AT BEDTIME
Qty: 0 | Refills: 0 | Status: DISCONTINUED | OUTPATIENT
Start: 2018-01-23 | End: 2018-01-27

## 2018-01-23 RX ORDER — INSULIN LISPRO 100/ML
VIAL (ML) SUBCUTANEOUS AT BEDTIME
Qty: 0 | Refills: 0 | Status: DISCONTINUED | OUTPATIENT
Start: 2018-01-23 | End: 2018-01-27

## 2018-01-23 RX ORDER — ONDANSETRON 8 MG/1
4 TABLET, FILM COATED ORAL EVERY 4 HOURS
Qty: 0 | Refills: 0 | Status: DISCONTINUED | OUTPATIENT
Start: 2018-01-23 | End: 2018-01-27

## 2018-01-23 RX ORDER — INSULIN LISPRO 100/ML
VIAL (ML) SUBCUTANEOUS
Qty: 0 | Refills: 0 | Status: DISCONTINUED | OUTPATIENT
Start: 2018-01-23 | End: 2018-01-27

## 2018-01-23 RX ORDER — QUETIAPINE FUMARATE 200 MG/1
25 TABLET, FILM COATED ORAL EVERY 6 HOURS
Qty: 0 | Refills: 0 | Status: DISCONTINUED | OUTPATIENT
Start: 2018-01-23 | End: 2018-01-27

## 2018-01-23 RX ADMIN — ONDANSETRON 4 MILLIGRAM(S): 8 TABLET, FILM COATED ORAL at 20:05

## 2018-01-23 RX ADMIN — Medication 525 MILLIGRAM(S): at 22:31

## 2018-01-23 RX ADMIN — Medication 525 MILLIGRAM(S): at 05:49

## 2018-01-23 RX ADMIN — Medication 525 MILLIGRAM(S): at 14:22

## 2018-01-23 RX ADMIN — Medication 40 MILLIGRAM(S): at 17:27

## 2018-01-23 RX ADMIN — Medication 40 MILLIGRAM(S): at 05:48

## 2018-01-23 RX ADMIN — ONDANSETRON 4 MILLIGRAM(S): 8 TABLET, FILM COATED ORAL at 08:41

## 2018-01-23 RX ADMIN — Medication 5 MILLIGRAM(S): at 23:54

## 2018-01-23 RX ADMIN — SERTRALINE 50 MILLIGRAM(S): 25 TABLET, FILM COATED ORAL at 11:40

## 2018-01-23 RX ADMIN — Medication 1: at 17:27

## 2018-01-23 RX ADMIN — Medication 88 MICROGRAM(S): at 05:49

## 2018-01-23 RX ADMIN — ENOXAPARIN SODIUM 40 MILLIGRAM(S): 100 INJECTION SUBCUTANEOUS at 05:48

## 2018-01-23 NOTE — PROGRESS NOTE ADULT - PROBLEM SELECTOR PLAN 6
- new onset with A1C here 6.7  - could be 2/2 prednisone use  - will start low dose ISS here and consistent carb diet - new onset with A1C here 6.7  - could be 2/2 prednisone use, glucose on BMP well controlled, will monitor

## 2018-01-23 NOTE — PROGRESS NOTE ADULT - SUBJECTIVE AND OBJECTIVE BOX
Follow Up:  pneumonia    Interval History: pt stable and afebrile, last night was again confused and felt worse, the respiratory status still the same    ROS:      All other systems negative    Constitutional: no fever, no chills  HEENT:  no vision changes, no sore throat, no rhinorrhea  Cardiovascular:  no chest pain, no palpitation  Respiratory:  + SOB, no cough  GI:  no abd pain, no vomiting, no diarrhea  urinary: no dysuria, no hematuria, no flank pain  musculoskeletal:  no joint pain, no joint swelling  skin:  no rash  neurology:  no headache, no seizure, no change in mental status        Allergies  Vitamin E (Hives)        ANTIMICROBIALS:  trimethoprim / sulfamethoxazole IVPB    trimethoprim / sulfamethoxazole IVPB 400 every 8 hours      OTHER MEDS:  acetaminophen   Tablet. 650 milliGRAM(s) Oral every 6 hours PRN  enoxaparin Injectable 40 milliGRAM(s) SubCutaneous every 24 hours  levothyroxine 88 MICROGram(s) Oral daily  predniSONE   Tablet 40 milliGRAM(s) Oral two times a day  sertraline 50 milliGRAM(s) Oral daily      Vital Signs Last 24 Hrs  T(C): 37.1 (23 Jan 2018 07:19), Max: 37.1 (22 Jan 2018 23:54)  T(F): 98.7 (23 Jan 2018 07:19), Max: 98.8 (23 Jan 2018 06:09)  HR: 83 (23 Jan 2018 07:19) (83 - 90)  BP: 122/79 (23 Jan 2018 07:19) (119/79 - 127/81)  BP(mean): --  RR: 20 (23 Jan 2018 07:19) (18 - 20)  SpO2: 95% (23 Jan 2018 07:19) (94% - 95%)    Physical Exam:  General:    NAD,  non toxic, A&O x 3  HEENT:    no oropharyngeal lesions,   no LAD,   neck supple  Cardio:     regular S1, S2,  no murmur  Respiratory:    clear b/l,    no wheezing  abd:     soft,   BS +,   no tenderness,    no organomegaly  :   no CVAT,  no suprapubic tenderness,   no  bowser  Musculoskeletal:   no joint swelling,   no edema  vascular: no lines, normal pulses  Skin:    no rash  Neurologic:     no focal deficit  psych: normal affect, no suicidal ideation                          11.3   7.96  )-----------( 159      ( 23 Jan 2018 09:01 )             33.6       01-23    136  |  99  |  13  ----------------------------<  226<H>  3.7   |  19<L>  |  0.96    Ca    8.3<L>      23 Jan 2018 08:57  Phos  2.7     01-22  Mg     2.1     01-22            MICROBIOLOGY:  v      Rapid RVP Result: NotDetec (01-20 @ 14:40)        RADIOLOGY:    < from: CT Angio Chest w/ IV Cont (01.20.18 @ 08:50) >  IMPRESSION:   No pulmonary embolism.    Scattered bilateral perihilar groundglass opacities with interlobular   septal thickening. The differential diagnosis includes  mild pulmonary   edema and pneumonia.    Situs inversus.    Stenosis of the proximal celiac artery.
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
## 2018-01-23 NOTE — PROGRESS NOTE ADULT - PROBLEM SELECTOR PLAN 1
Hypoxemic at 88% off O2 while ambulating per RN today.  - progressively worsening SOB with palpitations, weakness and fatigue for 1 month, differential includes infectious(?PCP) vs rheumatologic (vasculitis, connective tissue disease) vs. interstitial lung disease Hypoxemic at 88% off O2 while ambulating per RN today.  - progressively worsening SOB with palpitations, weakness and fatigue for 1 month, differential includes infectious(?PCP) vs rheumatologic (vasculitis, connective tissue disease) vs. interstitial lung disease  - saturating 93-97% on RA at rest, not tachypneic or tachycardic at this time  - EKG showed sinus tachycardia on admission  - CTA as above, b/l ground glass opacities, negative for PE  - no evidence of fluid overload, ischemic changes on EKG, troponins neg x2, unlikely cardiac  - afebrile, no leukocytosis, however given CT findings and long term steroids, concern for PCP, started on bactrim and high dose steroids for hypoxia  - antiGBM negative, ANCA negative, RF negative, C3 and C4 wnl  - f/u MPO and P3, OLEKSANDR  - pulm consulted - bronch scheduled for tomorrow for definitive PCP diagnosis  - ID following - MR brain ordered today given neurologic complaints

## 2018-01-23 NOTE — PROGRESS NOTE ADULT - SUBJECTIVE AND OBJECTIVE BOX
HPI:  66 yo female dextrocardia with situs inversus, PMR rx'd 6 weeks high dose steroids presented with progressive sob.  CTPA neg for PE but with ground glass nodules.  Currently on bactrim for possible PCP.  Pt desaturated to high 80's with exertion.  Not sob at rest with nasal 02. Troponins and pBNP negative  PAST MEDICAL & SURGICAL HISTORY:  Hyperlipidemia, unspecified hyperlipidemia type  Polymyalgia rheumatica  Depression  Hypothyroid      Allergies    Vitamin E (Hives)    Intolerances    Artifical Cherry Flavoring (Nausea)  narcotic analgesics (Unknown)        MEDICATIONS  (STANDING):  enoxaparin Injectable 40 milliGRAM(s) SubCutaneous every 24 hours  levothyroxine 88 MICROGram(s) Oral daily  predniSONE   Tablet 40 milliGRAM(s) Oral two times a day  sertraline 50 milliGRAM(s) Oral daily  trimethoprim / sulfamethoxazole IVPB      trimethoprim / sulfamethoxazole IVPB 400 milliGRAM(s) IV Intermittent every 8 hours    MEDICATIONS  (PRN):  acetaminophen   Tablet. 650 milliGRAM(s) Oral every 6 hours PRN mild or moderate pain            Vital Signs Last 24 Hrs  T(C): 37.1 (23 Jan 2018 07:19), Max: 37.1 (22 Jan 2018 23:54)  T(F): 98.7 (23 Jan 2018 07:19), Max: 98.8 (23 Jan 2018 06:09)  HR: 83 (23 Jan 2018 07:19) (83 - 90)  BP: 122/79 (23 Jan 2018 07:19) (119/79 - 127/81)  BP(mean): --  RR: 20 (23 Jan 2018 07:19) (18 - 20)  SpO2: 95% (23 Jan 2018 07:19) (88% - 95%)  Daily     Daily   I&O's Detail    22 Jan 2018 07:01  -  23 Jan 2018 07:00  --------------------------------------------------------  IN:    IV PiggyBack: 525 mL    Oral Fluid: 500 mL  Total IN: 1025 mL    OUT:    Voided: 600 mL  Total OUT: 600 mL    Total NET: 425 mL          Physical Exam  Neck without JVD  Lungs clear  Cor s1s2 2/6 sys m rlsb  Abd soft  Ext without edema  Pulses +2 DP  LABS          CBC Full  -  ( 22 Jan 2018 10:29 )  WBC Count : 8.10 K/uL  Hemoglobin : 12.5 g/dL  Hematocrit : 37.4 %  Platelet Count - Automated : 197 K/uL  Mean Cell Volume : 94.4 fl  Mean Cell Hemoglobin : 31.6 pg  Mean Cell Hemoglobin Concentration : 33.4 gm/dL  Auto Neutrophil # : 6.13 K/uL  Auto Lymphocyte # : 1.43 K/uL  Auto Monocyte # : 0.37 K/uL  Auto Eosinophil # : 0.06 K/uL  Auto Basophil # : 0.02 K/uL  Auto Neutrophil % : 75.7 %  Auto Lymphocyte % : 17.7 %  Auto Monocyte % : 4.6 %  Auto Eosinophil % : 0.7 %  Auto Basophil % : 0.2 %    01-22    135  |  100  |  15  ----------------------------<  123<H>  4.2   |  21<L>  |  0.89    Ca    9.4      22 Jan 2018 10:29  Phos  2.7     01-22  Mg     2.1     01-22  Troponin neg x2  pBNP 74      EKG:  < from: 12 Lead ECG (01.20.18 @ 06:30) >    Ventricular Rate 83 BPM    Atrial Rate 83 BPM    P-R Interval 168 ms    QRS Duration 68 ms     ms    QTc 446 ms    R Axis 130 degrees    T Axis 63 degrees    Diagnosis Line *** SUSPECT ARM LEAD REVERSAL, INTERPRETATION ASSUMES NO REVERSAL  NORMAL SINUS RHYTHM  ANTEROLATERAL INFARCT , AGE UNDETERMINED  official reading above unaware pt with dextrocardia  EKG is normal for pt with dextrocardia                < from: CT Angio Chest w/ IV Cont (01.20.18 @ 08:50) >    EXAM:  CT ANGIO CHEST (W)AW IC                            PROCEDURE DATE:  01/20/2018            INTERPRETATION:  CLINICAL INFORMATION: Shortness of breath.    COMPARISON: Correlation is made with chest x-ray dated 1/20/2018 at 5:51   AM.    PROCEDURE:   CTA of the Chest was performed with intravenous contrast.  90 ml of Omnipaque 350 was injected intravenously. 10 ml were discarded.  Sagittal and coronal reformats were performed as well as 3D Mini MIPs.      FINDINGS:    CHEST:     LUNGS AND LARGE AIRWAYS: Patent central airways. Scattered bilateral   perihilar groundglass opacities, with upper lobe predominance and   interlobular septal thickening.  PLEURA: No pleural effusion.  VESSELS: No pulmonary embolism.  HEART: Dextrocardia. Right aortic arch. No pericardial effusion.  MEDIASTINUM AND NEEL: Less than 1 cm bilateral hilar lymph nodes.  CHEST WALL AND LOWER NECK: Thyroid gland is diminutive.  VISUALIZED UPPER ABDOMEN: Situs inversus. Stenosis of the proximal celiac   artery.  BONES: Mild degenerative changes in the spine.    IMPRESSION:   No pulmonary embolism.    Scattered bilateral perihilar groundglass opacities with interlobular   septal thickening. The differential diagnosis includes  mild pulmonary   edema and pneumonia.    Situs inversus.    Stenosis of the proximal celiac artery.  Assessment and Plan:  66 yo female dextrocardia with situs inversus, PMR rx'd 6 weeks high dose steroids presented with progressive sob.  CTPA neg for PE but with ground glass nodules.  Currently on bactrim for possible PCP.  Pt desaturated to high 80's with exertion.  Not sob at rest with nasal 02. Troponins and pBNP negative  Recommend TTE to evaluate for VSD in pt with dextrocardia.  Cardiac etiology of sob unlikely with normal pBNP

## 2018-01-23 NOTE — PROGRESS NOTE ADULT - PROBLEM SELECTOR PLAN 5
- c/w zoloft home dose - c/w zoloft home dose  - very tearful and anxious this AM, will obtain psych consult for med recs, exacerbation of emotion could be 2/2 steroids

## 2018-01-23 NOTE — PROGRESS NOTE ADULT - ASSESSMENT
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    Considering patient was on high dose steroids PCP is a possibility - continue treatment for possible PCP infection with bactrim and prednisone. Supplemental O2 to keep sats > 92%. 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    # Considering patient was on high dose steroids PCP is a possibility - continue treatment for possible PCP infection with bactrim and prednisone. Supplemental O2 to keep sats > 92%.     # Plan for bronchoscopy for further evaluation on 01/24  - NPO after midnight  - Coags with morning labs  - hold morning lovenox dose

## 2018-01-23 NOTE — PROGRESS NOTE ADULT - ASSESSMENT
67F with situs inversus,  hyperlipidemia, hypothyroidism, was started on Crestor recently and developed muscle pain 11/2017and was seen by rheum, was considered PMR and giant cell arthritis in view of scalp tenderness, so was started on prednisone 60 and underwent temporal artery biopsy which was negative. The rheumatologist started to taper the prednisone but patient believes when she discontinued the Crestor her muscle pain resolved. 2 weeks into steroids she started to develop dyspnea with palpitation and fatigue. She denied fever, chills, cough, weight loss but she had altered mental status with a change in her a smelling, bleeding from the nose and night sweats.  She was born in [place] no recent travel, last travel was 2000 to [place], lives with her mother, has cats and used to had birds ( in parrots family) before.  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  She has strong family history of auto immune disorders like RA, mixed connective tissue disorder or Rheynaud    dyspnea with hypoxia, b/l groundglass opacities, epistaxis, ? AMS with impairment in taste and smell, ? recent DX of PML, giant cell arthritis with negative temporal artery BX on tapering steroids  strong family history of auto immune disorders, used to have birds, from [place]  negative ANCA, ESR: 44, CRP: 8.60  auto immune  vs infectious like PCP, fungal  imagings were discussed with radiology which stated that PCP is more likely in the picture    c/w bactrim 15 mg/kg divided to q8   bronch and biopsy tomottow  brain MRI  f/u  urine histo Ag and serum Ab  f/u galactomannan  f/u rheum w/u

## 2018-01-23 NOTE — PHYSICAL THERAPY INITIAL EVALUATION ADULT - IMPAIRMENTS CONTRIBUTING TO GAIT DEVIATIONS, PT EVAL
decreased oxygen saturation from 92% on room air to 87% on room air after ambulation; with 5 minute seated rest return to 93% oxygen saturation on room air

## 2018-01-23 NOTE — CONSULT NOTE ADULT - ASSESSMENT
67 year old WF with history of unspecified depression (etiologies include bereavement, hypothyroid, caregiver burden, recent noncompliance with Zoloft). Now with a delirium from PNA, hypoxia, corticosteroids. Panic attacks recently could also be secondary to steroids.    Recommend  Continue Zoloft at 50mg/day. Check QTc and if under 500ms, start Seroquel 25mg p.o. q6h prn anxiety, agitation, insomnia.   Follow TFTs. No benzodiazepines for now given her recent hypoxia.   Will follow with you here. Upon discharge, have pt. contact her insurance company for outpatient psychiatry referrals.   Thank you.     Roberto Benjamin M.D.  Psychiatry  [phone]

## 2018-01-23 NOTE — PROGRESS NOTE ADULT - SUBJECTIVE AND OBJECTIVE BOX
Patient is a 67y old  Female who presents with a chief complaint of SOB, palpitations (20 Jan 2018 14:58)      SUBJECTIVE / OVERNIGHT EVENTS:    MEDICATIONS  (STANDING):  enoxaparin Injectable 40 milliGRAM(s) SubCutaneous every 24 hours  levothyroxine 88 MICROGram(s) Oral daily  predniSONE   Tablet 40 milliGRAM(s) Oral two times a day  sertraline 50 milliGRAM(s) Oral daily  trimethoprim / sulfamethoxazole IVPB      trimethoprim / sulfamethoxazole IVPB 400 milliGRAM(s) IV Intermittent every 8 hours    MEDICATIONS  (PRN):  acetaminophen   Tablet. 650 milliGRAM(s) Oral every 6 hours PRN mild or moderate pain        CAPILLARY BLOOD GLUCOSE        I&O's Summary    22 Jan 2018 07:01  -  23 Jan 2018 07:00  --------------------------------------------------------  IN: 1025 mL / OUT: 600 mL / NET: 425 mL        PHYSICAL EXAM:  GENERAL: NAD, well-developed  HEAD:  Atraumatic, Normocephalic  EYES: EOMI, PERRLA, conjunctiva and sclera clear  NECK: Supple, No JVD  CHEST/LUNG: Clear to auscultation bilaterally; No wheeze  HEART: Regular rate and rhythm; No murmurs, rubs, or gallops  ABDOMEN: Soft, Nontender, Nondistended; Bowel sounds present  EXTREMITIES:  2+ Peripheral Pulses, No clubbing, cyanosis, or edema  PSYCH: AAOx3  NEUROLOGY: non-focal  SKIN: No rashes or lesions    LABS:  (01-22 @ 10:29)                        12.5  8.10 )-----------( 197                 37.4    Neutrophils = 6.13 (75.7%)  Lymphocytes = 1.43 (17.7%)  Eosinophils = 0.06 (0.7%)  Basophils = 0.02 (0.2%)  Monocytes = 0.37 (4.6%)  Bands = --%    WBC Trend: 8.10<--, 7.27<--, 8.2<--  Hb Trend: 12.5<--, 13.1<--, 13.9<--  Plt Trend: 197<--, 198<--, 180<--  01-22    135  |  100  |  15  ----------------------------<  123<H>  4.2   |  21<L>  |  0.89    Ca    9.4      22 Jan 2018 10:29  Phos  2.7     01-22  Mg     2.1     01-22      Creatinine Trend: 0.89<--, 0.81<--, 0.87<--            Microbiology:  Urine Cx:  Blood Cx:    RADIOLOGY & ADDITIONAL TESTS:  X- Ray:  CT:  Ultrasound:  [ ] imaging personally reviewed and interpreted by me    Consultant(s) Notes Reviewed:      Care Discussed with Consultants/Other Providers: Patient is a 67y old  Female who presents with a chief complaint of SOB, palpitations (20 Jan 2018 14:58)      SUBJECTIVE / OVERNIGHT EVENTS: Overnight patient states she was feeling very sick. She reports worsening lower back pain, nausea and vomiting, intermittent headache unchanged. She states she is unable to really pinpoint what is wrong but states "I feel so sick". Denies changes in vision or hearing, dizziness, lightheadedness, CP, cough. Reports SOB and bloody nasal mucus. Denies constipation and diarrhea.     MEDICATIONS  (STANDING):  enoxaparin Injectable 40 milliGRAM(s) SubCutaneous every 24 hours  levothyroxine 88 MICROGram(s) Oral daily  predniSONE   Tablet 40 milliGRAM(s) Oral two times a day  sertraline 50 milliGRAM(s) Oral daily  trimethoprim / sulfamethoxazole IVPB      trimethoprim / sulfamethoxazole IVPB 400 milliGRAM(s) IV Intermittent every 8 hours    MEDICATIONS  (PRN):  acetaminophen   Tablet. 650 milliGRAM(s) Oral every 6 hours PRN mild or moderate pain      I&O's Summary    22 Jan 2018 07:01  -  23 Jan 2018 07:00  --------------------------------------------------------  IN: 1025 mL / OUT: 600 mL / NET: 425 mL        PHYSICAL EXAM:  Vital Signs Last 24 Hrs  T(C): 37.1 (23 Jan 2018 07:19), Max: 37.1 (22 Jan 2018 23:54)  T(F): 98.7 (23 Jan 2018 07:19), Max: 98.8 (23 Jan 2018 06:09)  HR: 83 (23 Jan 2018 07:19) (83 - 90)  BP: 122/79 (23 Jan 2018 07:19) (119/79 - 127/81)  BP(mean): --  RR: 20 (23 Jan 2018 07:19) (18 - 20)  SpO2: 95% (23 Jan 2018 07:19) (88% - 95%)    GENERAL: tearful, not in respiratory distress  HEAD/FACE:  Atraumatic, Normocephalic  EYES: EOMI, PERRLA, conjunctiva and sclera clear  NECK: Supple, No JVD  CHEST/LUNG: Clear to auscultation bilaterally; No wheezes or rales  HEART: Regular rate and rhythm; No murmurs, rubs, or gallops  ABDOMEN: Soft, Nontender, Nondistended; Bowel sounds present  BACK: ttp of paraspinal muscles in thoracic region, ttp at SI joints, no tenderness on spine  EXTREMITIES:  2+ Peripheral Pulses, No clubbing, cyanosis, or edema, tenderness to palpation of b/l calves  PSYCH: AAOx3  NEUROLOGY: non-focal  SKIN: slight puffiness to b/l cheeks under eyes    LABS:  (01-22 @ 10:29)                        12.5  8.10 )-----------( 197                 37.4    Neutrophils = 6.13 (75.7%)  Lymphocytes = 1.43 (17.7%)  Eosinophils = 0.06 (0.7%)  Basophils = 0.02 (0.2%)  Monocytes = 0.37 (4.6%)  Bands = --%    WBC Trend: 8.10<--, 7.27<--, 8.2<--  Hb Trend: 12.5<--, 13.1<--, 13.9<--  Plt Trend: 197<--, 198<--, 180<--  01-22    135  |  100  |  15  ----------------------------<  123<H>  4.2   |  21<L>  |  0.89    Ca    9.4      22 Jan 2018 10:29  Phos  2.7     01-22  Mg     2.1     01-22      Creatinine Trend: 0.89<--, 0.81<--, 0.87<--        RADIOLOGY & ADDITIONAL TESTS:  MR brain pending    Consultant(s) Notes Reviewed:  rheum, pulm, ID    Care Discussed with Consultants/Other Providers:

## 2018-01-23 NOTE — PROGRESS NOTE ADULT - PROBLEM SELECTOR PLAN 2
- diagnosed in November after complaining of diffuse muscle pains, pt states rheumatologist diagnosed with a blood test but unsure of what it was  - started on steroids at that time, with resolution of muscle pains only after stopping crestor 2 weeks later  - will continue with steroids here as she has chronically been on them pending further rheum w/u, may need higher dose - diagnosed in November after complaining of diffuse muscle pains, pt states rheumatologist diagnosed with a blood test but unsure of what it was  - started on steroids at that time, with resolution of muscle pains only after stopping crestor 2 weeks later  - dx questionable, continuing with higher dose steroids here for concern of PCP with hypoxia

## 2018-01-23 NOTE — PROGRESS NOTE ADULT - SUBJECTIVE AND OBJECTIVE BOX
CHIEF COMPLAINT:  shortness of breath    Interval Events:  last night patient was again feeling sick with shortness of breath, back pain, confusion  is very anxious about her condition  sats 95% on 2 L , 88% on RA  no new complaints  waiting for brain MRI    REVIEW OF SYSTEMS:  Constitutional: [ ] negative [ ] fevers [ ] chills [x] weight loss [ ] weight gain  HEENT: [x] negative [ ] dry eyes [ ] eye irritation [ ] postnasal drip [ ] nasal congestion  CV: [ ] negative  [ x] chest pain [ x] orthopnea [x] palpitations [ ] murmur  Resp: [ ] negative [ ] cough [x ] shortness of breath [x ] dyspnea [ ] wheezing [ ] sputum [ ] hemoptysis  GI: [x ] negative [ ] nausea [ ] vomiting [ ] diarrhea [ ] constipation [ ] abd pain [ ] dysphagia   : [x ] negative [ ] dysuria [ ] nocturia [ ] hematuria [ ] increased urinary frequency  Musculoskeletal: [ ] negative [ ] back pain [x ] myalgias [ ] arthralgias [ ] fracture  Skin: [ x] negative [ ] rash [ ] itch  Neurological: [x ] negative [ ] headache [ ] dizziness [ ] syncope [ ] weakness [ ] numbness  Psychiatric: [ ] negative [ ] anxiety [ x] depression  Endocrine: [x ] negative [ ] diabetes [ ] thyroid problem  Hematologic/Lymphatic: [x ] negative [ ] anemia [ ] bleeding problem  Allergic/Immunologic: [x ] negative [ ] itchy eyes [ ] nasal discharge [ ] hives [ ] angioedema    OBJECTIVE:  ICU Vital Signs Last 24 Hrs  T(C): 37.1 (23 Jan 2018 07:19), Max: 37.1 (22 Jan 2018 23:54)  T(F): 98.7 (23 Jan 2018 07:19), Max: 98.8 (23 Jan 2018 06:09)  HR: 83 (23 Jan 2018 07:19) (83 - 90)  BP: 122/79 (23 Jan 2018 07:19) (119/79 - 127/81)  RR: 20 (23 Jan 2018 07:19) (18 - 20)  SpO2: 95% (23 Jan 2018 07:19) (94% - 95%)        01-22 @ 07:01  -  01-23 @ 07:00  --------------------------------------------------------  IN: 1025 mL / OUT: 600 mL / NET: 425 mL    01-23 @ 07:01  - 01-23 @ 10:43  --------------------------------------------------------  IN: 0 mL / OUT: 0 mL / NET: 0 mL      CAPILLARY BLOOD GLUCOSE      PHYSICAL EXAM:  General:    NAD, non toxic, A&O x3  HEENT:   no oropharyngeal lesions, no LAD, neck supple, no pallor  Cardio:    regular S1,S2, no murmur, dextrocardia  Respiratory:   b/l air entry equal, no wheezing, no crepts  abd:   soft, BS +, not tender, no hepatosplenomegaly  Musculoskeletal : no joint swelling, no edema  Skin:    no rash  Neurologic:     no focal deficits, sensory and motor exam grossly intact  psych: normal affect, no suicidal ideation      HOSPITAL MEDICATIONS:  MEDICATIONS  (STANDING):  enoxaparin Injectable 40 milliGRAM(s) SubCutaneous every 24 hours  levothyroxine 88 MICROGram(s) Oral daily  predniSONE   Tablet 40 milliGRAM(s) Oral two times a day  sertraline 50 milliGRAM(s) Oral daily  trimethoprim / sulfamethoxazole IVPB      trimethoprim / sulfamethoxazole IVPB 400 milliGRAM(s) IV Intermittent every 8 hours    MEDICATIONS  (PRN):  acetaminophen   Tablet. 650 milliGRAM(s) Oral every 6 hours PRN mild or moderate pain      LABS:                        11.3   7.96  )-----------( 159      ( 23 Jan 2018 09:01 )             33.6     Hgb Trend: 11.3<--, 12.5<--, 13.1<--, 13.9<--  01-23    136  |  99  |  13  ----------------------------<  226<H>  3.7   |  19<L>  |  0.96    Ca    8.3<L>      23 Jan 2018 08:57  Phos  2.7     01-22  Mg     2.1     01-22      Creatinine Trend: 0.96<--, 0.89<--, 0.81<--, 0.87<--      MICROBIOLOGY:     RADIOLOGY:  [ ] Reviewed and interpreted by me    CT angio CHEST  No pulmonary embolism.   Scattered bilateral perihilar groundglass opacities with interlobular septal  thickening. The differential diagnosis includes mild pulmonary edema and  pneumonia.   Situs inversus.   Stenosis of the proximal celiac artery.

## 2018-01-23 NOTE — PHYSICAL THERAPY INITIAL EVALUATION ADULT - PERTINENT HX OF CURRENT PROBLEM, REHAB EVAL
as per chart review: situs inversus, PMR on steroids, hyperlipidemia, hypothyroidism presenting with 1 month of progressively worsening shortness of breath, palpitations, weakness and fatigue. Admitted with acute hypoxemic respiratory failure, polymyalgia rheumatica.

## 2018-01-24 ENCOUNTER — RESULT REVIEW (OUTPATIENT)
Age: 68
End: 2018-01-24

## 2018-01-24 LAB
ANION GAP SERPL CALC-SCNC: 16 MMOL/L — SIGNIFICANT CHANGE UP (ref 5–17)
APTT BLD: 26.6 SEC — LOW (ref 27.5–37.4)
B PERT IGG+IGM PNL SER: CLEAR — SIGNIFICANT CHANGE UP
BUN SERPL-MCNC: 11 MG/DL — SIGNIFICANT CHANGE UP (ref 7–23)
CALCIUM SERPL-MCNC: 9.1 MG/DL — SIGNIFICANT CHANGE UP (ref 8.4–10.5)
CHLORIDE SERPL-SCNC: 99 MMOL/L — SIGNIFICANT CHANGE UP (ref 96–108)
CK SERPL-CCNC: 66 U/L — SIGNIFICANT CHANGE UP (ref 25–170)
CO2 SERPL-SCNC: 20 MMOL/L — LOW (ref 22–31)
COLOR FLD: SIGNIFICANT CHANGE UP
CREAT SERPL-MCNC: 1.05 MG/DL — SIGNIFICANT CHANGE UP (ref 0.5–1.3)
CRYOGLOB SERPL-MCNC: NEGATIVE — SIGNIFICANT CHANGE UP
FLUID INTAKE SUBSTANCE CLASS: SIGNIFICANT CHANGE UP
FLUID SEGMENTED GRANULOCYTES: 30 % — SIGNIFICANT CHANGE UP
GLUCOSE BLDC GLUCOMTR-MCNC: 119 MG/DL — HIGH (ref 70–99)
GLUCOSE BLDC GLUCOMTR-MCNC: 96 MG/DL — SIGNIFICANT CHANGE UP (ref 70–99)
GLUCOSE SERPL-MCNC: 192 MG/DL — HIGH (ref 70–99)
GRAM STN FLD: SIGNIFICANT CHANGE UP
H CAPSUL AG SPEC-ACNC: SIGNIFICANT CHANGE UP
H CAPSUL AG UR QL IA: SIGNIFICANT CHANGE UP
HCT VFR BLD CALC: 33 % — LOW (ref 34.5–45)
HGB BLD-MCNC: 12.1 G/DL — SIGNIFICANT CHANGE UP (ref 11.5–15.5)
INR BLD: 1.04 RATIO — SIGNIFICANT CHANGE UP (ref 0.88–1.16)
LYMPHOCYTES # FLD: 47 % — SIGNIFICANT CHANGE UP
MCHC RBC-ENTMCNC: 34.8 PG — HIGH (ref 27–34)
MCHC RBC-ENTMCNC: 36.6 GM/DL — HIGH (ref 32–36)
MCV RBC AUTO: 95 FL — SIGNIFICANT CHANGE UP (ref 80–100)
MESOTHL CELL # FLD: 2 % — SIGNIFICANT CHANGE UP
MONOS+MACROS # FLD: 21 % — SIGNIFICANT CHANGE UP
PLATELET # BLD AUTO: 270 K/UL — SIGNIFICANT CHANGE UP (ref 150–400)
POTASSIUM SERPL-MCNC: 3.9 MMOL/L — SIGNIFICANT CHANGE UP (ref 3.5–5.3)
POTASSIUM SERPL-SCNC: 3.9 MMOL/L — SIGNIFICANT CHANGE UP (ref 3.5–5.3)
PROTHROM AB SERPL-ACNC: 11.4 SEC — SIGNIFICANT CHANGE UP (ref 9.8–12.7)
RBC # BLD: 3.47 M/UL — LOW (ref 3.8–5.2)
RBC # FLD: 13.9 % — SIGNIFICANT CHANGE UP (ref 10.3–14.5)
RCV VOL RI: 285 /UL — HIGH (ref 0–5)
SODIUM SERPL-SCNC: 135 MMOL/L — SIGNIFICANT CHANGE UP (ref 135–145)
SPECIMEN SOURCE: SIGNIFICANT CHANGE UP
TOTAL NUCLEATED CELL COUNT, BODY FLUID: 255 /UL — HIGH (ref 0–5)
TUBE TYPE: SIGNIFICANT CHANGE UP
WBC # BLD: 13 K/UL — HIGH (ref 3.8–10.5)
WBC # FLD AUTO: 13 K/UL — HIGH (ref 3.8–10.5)

## 2018-01-24 PROCEDURE — 88173 CYTOPATH EVAL FNA REPORT: CPT | Mod: 26

## 2018-01-24 PROCEDURE — 31625 BRONCHOSCOPY W/BIOPSY(S): CPT | Mod: 59

## 2018-01-24 PROCEDURE — 93306 TTE W/DOPPLER COMPLETE: CPT | Mod: 26

## 2018-01-24 PROCEDURE — 99233 SBSQ HOSP IP/OBS HIGH 50: CPT

## 2018-01-24 PROCEDURE — 71045 X-RAY EXAM CHEST 1 VIEW: CPT | Mod: 26

## 2018-01-24 PROCEDURE — 99233 SBSQ HOSP IP/OBS HIGH 50: CPT | Mod: 25,GC

## 2018-01-24 PROCEDURE — 99232 SBSQ HOSP IP/OBS MODERATE 35: CPT

## 2018-01-24 PROCEDURE — 88312 SPECIAL STAINS GROUP 1: CPT | Mod: 26

## 2018-01-24 PROCEDURE — 88112 CYTOPATH CELL ENHANCE TECH: CPT | Mod: 26,59

## 2018-01-24 PROCEDURE — 31628 BRONCHOSCOPY/LUNG BX EACH: CPT

## 2018-01-24 PROCEDURE — 88305 TISSUE EXAM BY PATHOLOGIST: CPT | Mod: 26

## 2018-01-24 PROCEDURE — 31624 DX BRONCHOSCOPE/LAVAGE: CPT

## 2018-01-24 RX ORDER — SODIUM CHLORIDE 9 MG/ML
1000 INJECTION INTRAMUSCULAR; INTRAVENOUS; SUBCUTANEOUS
Qty: 0 | Refills: 0 | Status: COMPLETED | OUTPATIENT
Start: 2018-01-24 | End: 2018-01-25

## 2018-01-24 RX ADMIN — SERTRALINE 50 MILLIGRAM(S): 25 TABLET, FILM COATED ORAL at 15:44

## 2018-01-24 RX ADMIN — Medication 525 MILLIGRAM(S): at 15:42

## 2018-01-24 RX ADMIN — Medication 40 MILLIGRAM(S): at 17:50

## 2018-01-24 RX ADMIN — Medication 525 MILLIGRAM(S): at 22:21

## 2018-01-24 RX ADMIN — Medication 650 MILLIGRAM(S): at 05:52

## 2018-01-24 RX ADMIN — Medication 88 MICROGRAM(S): at 05:43

## 2018-01-24 RX ADMIN — Medication 40 MILLIGRAM(S): at 05:43

## 2018-01-24 RX ADMIN — Medication 525 MILLIGRAM(S): at 05:43

## 2018-01-24 RX ADMIN — Medication 650 MILLIGRAM(S): at 04:52

## 2018-01-24 RX ADMIN — SODIUM CHLORIDE 75 MILLILITER(S): 9 INJECTION INTRAMUSCULAR; INTRAVENOUS; SUBCUTANEOUS at 17:50

## 2018-01-24 NOTE — PROGRESS NOTE ADULT - PROBLEM SELECTOR PLAN 5
- c/w zoloft home dose  - very tearful and anxious this AM, will obtain psych consult for med recs, exacerbation of emotion could be 2/2 steroids - c/w zoloft home dose  - very tearful and anxious this AM, exacerbation of emotion could be 2/2 steroids  - psych consulted, seroquel prn q6 for anxiety, insomnia

## 2018-01-24 NOTE — PROGRESS NOTE ADULT - SUBJECTIVE AND OBJECTIVE BOX
Follow Up:  pneumonia    Interval History: pt stable, afebrile, going for bronch and biopsy today    ROS:      All other systems negative    Constitutional: no fever, no chills  HEENT:  no vision changes, no sore throat, no rhinorrhea  Cardiovascular:  no chest pain, no palpitation  Respiratory:  + SOB, no cough  GI:  no abd pain, no vomiting, no diarrhea  urinary: no dysuria, no hematuria, no flank pain  musculoskeletal:  no joint pain, no joint swelling  skin:  no rash  neurology:  no headache, no seizure, some change in mental status at nights        Allergies  Vitamin E (Hives)        ANTIMICROBIALS:  trimethoprim / sulfamethoxazole IVPB    trimethoprim / sulfamethoxazole IVPB 400 every 8 hours      OTHER MEDS:  acetaminophen   Tablet. 650 milliGRAM(s) Oral every 6 hours PRN  enoxaparin Injectable 40 milliGRAM(s) SubCutaneous every 24 hours  insulin lispro (HumaLOG) corrective regimen sliding scale   SubCutaneous three times a day before meals  insulin lispro (HumaLOG) corrective regimen sliding scale   SubCutaneous at bedtime  levothyroxine 88 MICROGram(s) Oral daily  melatonin 5 milliGRAM(s) Oral at bedtime  ondansetron    Tablet 4 milliGRAM(s) Oral every 4 hours PRN  predniSONE   Tablet 40 milliGRAM(s) Oral two times a day  QUEtiapine 25 milliGRAM(s) Oral every 6 hours PRN  sertraline 50 milliGRAM(s) Oral daily      Vital Signs Last 24 Hrs  T(C): 36.8 (24 Jan 2018 07:10), Max: 37.1 (23 Jan 2018 16:12)  T(F): 98.3 (24 Jan 2018 07:10), Max: 98.7 (23 Jan 2018 16:12)  HR: 87 (24 Jan 2018 07:10) (83 - 92)  BP: 151/83 (24 Jan 2018 07:10) (122/80 - 151/83)  BP(mean): --  RR: 18 (24 Jan 2018 07:10) (18 - 20)  SpO2: 94% (24 Jan 2018 07:10) (92% - 94%)    Physical Exam:  General:    NAD,  non toxic, A&O x 3  HEENT:    no oropharyngeal lesions,   no LAD,   neck supple  Cardio:     regular S1, S2,  no murmur  Respiratory:    clear b/l,    no wheezing  abd:     soft,   BS +,   no tenderness,    no organomegaly  :   no CVAT,  no suprapubic tenderness,   no  bowser  Musculoskeletal:   no joint swelling,   no edema  vascular: no lines, normal pulses  Skin:    no rash  Neurologic:     no focal deficit  psych: normal affect, no suicidal ideation                          12.1   13.0  )-----------( 270      ( 24 Jan 2018 07:35 )             33.0       01-24    135  |  99  |  11  ----------------------------<  192<H>  3.9   |  20<L>  |  1.05    Ca    9.1      24 Jan 2018 07:35            MICROBIOLOGY:  v      Rapid RVP Result: NotDetec (01-20 @ 14:40)        RADIOLOGY:    < from: CT Angio Chest w/ IV Cont (01.20.18 @ 08:50) >    IMPRESSION:   No pulmonary embolism.    Scattered bilateral perihilar groundglass opacities with interlobular   septal thickening. The differential diagnosis includes  mild pulmonary   edema and pneumonia.    Situs inversus.    Stenosis of the proximal celiac artery.      < from: MR Head w/wo IV Cont (01.23.18 @ 13:52) >    IMPRESSION: Minimal small vessel white matter ischemic changes. No acute   infarcts, hemorrhage or mass. No abnormal enhancement.            IMPRESSION: Minimal small vessel white matter ischemic changes. No acute   infarcts, hemorrhage or mass. No abnormal enhancement.      < end of copied text >

## 2018-01-24 NOTE — PROGRESS NOTE ADULT - ASSESSMENT
Pt improved with RX  Likely with PCP   Possible ILD  No acute cardiac issues  NO CHF  Dextrocardia - stable    Plan   Continue present management  Pulmonary, ID and rheum f/u

## 2018-01-24 NOTE — PROGRESS NOTE ADULT - PROBLEM SELECTOR PLAN 2
- diagnosed in November after complaining of diffuse muscle pains, pt states rheumatologist diagnosed with a blood test but unsure of what it was  - started on steroids at that time, with resolution of muscle pains only after stopping crestor 2 weeks later  - dx questionable, continuing with higher dose steroids here for concern of PCP with hypoxia

## 2018-01-24 NOTE — PROGRESS NOTE ADULT - SUBJECTIVE AND OBJECTIVE BOX
CHIEF COMPLAINT:  Shortness of breath    Interval Events:  patient seen and examined this am prior to bronchoscopy  slight improvement in breathing   RA sats 94% now  stil very anxious  no other complaints    REVIEW OF SYSTEMS:  Constitutional: [ ] negative [ ] fevers [ ] chills [x] weight loss [ ] weight gain  HEENT: [x] negative [ ] dry eyes [ ] eye irritation [ ] postnasal drip [ ] nasal congestion  CV: [ ] negative  [ x] chest pain [ x] orthopnea [x] palpitations [ ] murmur  Resp: [ ] negative [ ] cough [x ] shortness of breath [x ] dyspnea [ ] wheezing [ ] sputum [ ] hemoptysis  GI: [x ] negative [ ] nausea [ ] vomiting [ ] diarrhea [ ] constipation [ ] abd pain [ ] dysphagia   : [x ] negative [ ] dysuria [ ] nocturia [ ] hematuria [ ] increased urinary frequency  Musculoskeletal: [ ] negative [ ] back pain [x ] myalgias [ ] arthralgias [ ] fracture  Skin: [ x] negative [ ] rash [ ] itch  Neurological: [x ] negative [ ] headache [ ] dizziness [ ] syncope [ ] weakness [ ] numbness  Psychiatric: [ ] negative [ ] anxiety [ x] depression  Endocrine: [x ] negative [ ] diabetes [ ] thyroid problem  Hematologic/Lymphatic: [x ] negative [ ] anemia [ ] bleeding problem  Allergic/Immunologic: [x ] negative [ ] itchy eyes [ ] nasal discharge [ ] hives [ ] angioedema    OBJECTIVE:  ICU Vital Signs Last 24 Hrs  T(C): 36.7 (24 Jan 2018 16:30), Max: 36.8 (23 Jan 2018 23:54)  T(F): 98.1 (24 Jan 2018 16:30), Max: 98.3 (23 Jan 2018 23:54)  HR: 82 (24 Jan 2018 16:30) (82 - 89)  BP: 119/70 (24 Jan 2018 16:30) (119/70 - 151/83)  RR: 18 (24 Jan 2018 16:30) (18 - 20)  SpO2: 96% (24 Jan 2018 16:30) (92% - 96%)        01-23 @ 07:01  -  01-24 @ 07:00  --------------------------------------------------------  IN: 525 mL / OUT: 700 mL / NET: -175 mL    01-24 @ 07:01 - 01-24 @ 17:28  --------------------------------------------------------  IN: 0 mL / OUT: 0 mL / NET: 0 mL      CAPILLARY BLOOD GLUCOSE      POCT Blood Glucose.: 119 mg/dL (24 Jan 2018 16:52)      PHYSICAL EXAM:  General:    NAD, non toxic, A&O x3  HEENT:   no oropharyngeal lesions, no LAD, neck supple, no pallor  Cardio:    regular S1,S2, no murmur, dextrocardia  Respiratory:   b/l air entry equal, no wheezing, no crepts  abd:   soft, BS +, not tender, no hepatosplenomegaly  Musculoskeletal : no joint swelling, no edema  Skin:    no rash  Neurologic:     no focal deficits, sensory and motor exam grossly intact  psych: normal affect, no suicidal ideation      HOSPITAL MEDICATIONS:  MEDICATIONS  (STANDING):  enoxaparin Injectable 40 milliGRAM(s) SubCutaneous every 24 hours  insulin lispro (HumaLOG) corrective regimen sliding scale   SubCutaneous three times a day before meals  insulin lispro (HumaLOG) corrective regimen sliding scale   SubCutaneous at bedtime  levothyroxine 88 MICROGram(s) Oral daily  melatonin 5 milliGRAM(s) Oral at bedtime  predniSONE   Tablet 40 milliGRAM(s) Oral two times a day  sertraline 50 milliGRAM(s) Oral daily  sodium chloride 0.9%. 1000 milliLiter(s) (75 mL/Hr) IV Continuous <Continuous>  trimethoprim / sulfamethoxazole IVPB      trimethoprim / sulfamethoxazole IVPB 400 milliGRAM(s) IV Intermittent every 8 hours    MEDICATIONS  (PRN):  acetaminophen   Tablet. 650 milliGRAM(s) Oral every 6 hours PRN mild or moderate pain  ondansetron    Tablet 4 milliGRAM(s) Oral every 4 hours PRN Nausea and/or Vomiting  QUEtiapine 25 milliGRAM(s) Oral every 6 hours PRN agitation, anxiety, insomnia      LABS:                        12.1   13.0  )-----------( 270      ( 24 Jan 2018 07:35 )             33.0     Hgb Trend: 12.1<--, 11.3<--, 12.5<--, 13.1<--, 13.9<--  01-24    135  |  99  |  11  ----------------------------<  192<H>  3.9   |  20<L>  |  1.05    Ca    9.1      24 Jan 2018 07:35      Creatinine Trend: 1.05<--, 0.96<--, 0.89<--, 0.81<--, 0.87<--  PT/INR - ( 24 Jan 2018 07:35 )   PT: 11.4 sec;   INR: 1.04 ratio         PTT - ( 24 Jan 2018 07:35 )  PTT:26.6 sec    Arterial Blood Gas:  01-23 @ 17:37  7.46/30/62/21/93/-1.8    RADIOLOGY:  [ ] Reviewed and interpreted by me    CT angio CHEST  No pulmonary embolism.   Scattered bilateral perihilar groundglass opacities with interlobular septal  thickening. The differential diagnosis includes mild pulmonary edema and  pneumonia.   Situs inversus.   Stenosis of the proximal celiac artery.

## 2018-01-24 NOTE — PROGRESS NOTE ADULT - PROBLEM SELECTOR PLAN 1
Hypoxemic at 88% off O2 while ambulating per RN today.  - progressively worsening SOB with palpitations, weakness and fatigue for 1 month, differential includes infectious(?PCP) vs rheumatologic (vasculitis, connective tissue disease) vs. interstitial lung disease  - saturating 93-97% on RA at rest, not tachypneic or tachycardic at this time  - EKG showed sinus tachycardia on admission  - CTA as above, b/l ground glass opacities, negative for PE  - no evidence of fluid overload, ischemic changes on EKG, troponins neg x2, unlikely cardiac  - afebrile, no leukocytosis, however given CT findings and long term steroids, concern for PCP, started on bactrim and high dose steroids for hypoxia  - antiGBM negative, ANCA negative, RF negative, C3 and C4 wnl  - f/u MPO and P3, OLEKSANDR  - pulm consulted - bronch scheduled for tomorrow for definitive PCP diagnosis  - ID following - MR brain ordered today given neurologic complaints Hypoxemic at 88% off O2 while ambulating per RN today.  - progressively worsening SOB with palpitations, weakness and fatigue for 1 month, differential includes infectious(?PCP) vs rheumatologic (vasculitis, connective tissue disease) vs. interstitial lung disease  - saturating 93-97% on RA at rest, not tachypneic or tachycardic at this time  - EKG showed sinus tachycardia on admission  - CTA as above, b/l ground glass opacities, negative for PE  - no evidence of fluid overload, ischemic changes on EKG, troponins neg x2, unlikely cardiac  - afebrile, no leukocytosis, however given CT findings and long term steroids, concern for PCP, started on bactrim and high dose steroids for hypoxia  - antiGBM negative, ANCA negative, RF negative, C3 and C4 wnl  - OLEKSANDR 1:160  - pulm consulted - bronch done today for definitive PCP diagnosis  - ID following - MR brain negative

## 2018-01-24 NOTE — PROGRESS NOTE ADULT - PROBLEM SELECTOR PLAN 4
- no personal or family history of kidney disease, no history of diabetes  - reports darkening in color of urine, no other symptoms  - f/u rheumatologic w/u as above  - f/u urine studies - no personal or family history of kidney disease, no history of diabetes  - reports darkening in color of urine, no other symptoms  - f/u rheumatologic w/u as above

## 2018-01-24 NOTE — PROGRESS NOTE ADULT - ASSESSMENT
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    Patient's hypoxia in setting of high dose steroid use likely from PCP infection. Vasculitis/ interstitial lung disease/ hypersensitivity pneumonitis also possibility but less likely as symptoms developed when patient was on high dose steroids. Some improvement in reparatory function on bactrim and prednisone. Continue same for now. S/p bronchoscopy with BAL and transbronchial biopsy today. Follow results from BAL and biopsy histopathology.

## 2018-01-24 NOTE — PROGRESS NOTE ADULT - SUBJECTIVE AND OBJECTIVE BOX
Patient is a 67y old  Female who presents with a chief complaint of SOB, palpitations (20 Jan 2018 14:58)      SUBJECTIVE / OVERNIGHT EVENTS:    MEDICATIONS  (STANDING):  enoxaparin Injectable 40 milliGRAM(s) SubCutaneous every 24 hours  insulin lispro (HumaLOG) corrective regimen sliding scale   SubCutaneous three times a day before meals  insulin lispro (HumaLOG) corrective regimen sliding scale   SubCutaneous at bedtime  levothyroxine 88 MICROGram(s) Oral daily  melatonin 5 milliGRAM(s) Oral at bedtime  predniSONE   Tablet 40 milliGRAM(s) Oral two times a day  sertraline 50 milliGRAM(s) Oral daily  trimethoprim / sulfamethoxazole IVPB      trimethoprim / sulfamethoxazole IVPB 400 milliGRAM(s) IV Intermittent every 8 hours    MEDICATIONS  (PRN):  acetaminophen   Tablet. 650 milliGRAM(s) Oral every 6 hours PRN mild or moderate pain  ondansetron    Tablet 4 milliGRAM(s) Oral every 4 hours PRN Nausea and/or Vomiting  QUEtiapine 25 milliGRAM(s) Oral every 6 hours PRN agitation, anxiety, insomnia        CAPILLARY BLOOD GLUCOSE      POCT Blood Glucose.: 127 mg/dL (23 Jan 2018 21:57)  POCT Blood Glucose.: 164 mg/dL (23 Jan 2018 17:19)    I&O's Summary    23 Jan 2018 07:01  -  24 Jan 2018 07:00  --------------------------------------------------------  IN: 525 mL / OUT: 700 mL / NET: -175 mL    24 Jan 2018 07:01  -  24 Jan 2018 09:53  --------------------------------------------------------  IN: 0 mL / OUT: 0 mL / NET: 0 mL        PHYSICAL EXAM:  GENERAL: NAD, well-developed  HEAD:  Atraumatic, Normocephalic  EYES: EOMI, PERRLA, conjunctiva and sclera clear  NECK: Supple, No JVD  CHEST/LUNG: Clear to auscultation bilaterally; No wheeze  HEART: Regular rate and rhythm; No murmurs, rubs, or gallops  ABDOMEN: Soft, Nontender, Nondistended; Bowel sounds present  EXTREMITIES:  2+ Peripheral Pulses, No clubbing, cyanosis, or edema  PSYCH: AAOx3  NEUROLOGY: non-focal  SKIN: No rashes or lesions    LABS:  (01-24 @ 07:35)                        12.1  13.0 )-----------( 270                 33.0    Neutrophils = -- (--%)  Lymphocytes = -- (--%)  Eosinophils = -- (--%)  Basophils = -- (--%)  Monocytes = -- (--%)  Bands = --%    WBC Trend: 13.0<--, 7.96<--, 8.10<--  Hb Trend: 12.1<--, 11.3<--, 12.5<--, 13.1<--, 13.9<--  Plt Trend: 270<--, 159<--, 197<--, 198<--, 180<--  01-24    135  |  99  |  11  ----------------------------<  192<H>  3.9   |  20<L>  |  1.05    Ca    9.1      24 Jan 2018 07:35  Phos  2.7     01-22  Mg     2.1     01-22      Creatinine Trend: 1.05<--, 0.96<--, 0.89<--, 0.81<--, 0.87<--  ( 24 Jan 2018 07:35 )   PT: 11.4 sec;   INR: 1.04 ratio;       PTT:26.6 sec  CARDIAC MARKERS ( 24 Jan 2018 07:35 )  Trop x     / CK 66 U/L / CKMB x               Microbiology:  Urine Cx:  Blood Cx:    RADIOLOGY & ADDITIONAL TESTS:  X- Ray:  CT:  Ultrasound:  [ ] imaging personally reviewed and interpreted by me    Consultant(s) Notes Reviewed:      Care Discussed with Consultants/Other Providers: Patient is a 67y old  Female who presents with a chief complaint of SOB, palpitations (20 Jan 2018 14:58)      SUBJECTIVE / OVERNIGHT EVENTS:    MEDICATIONS  (STANDING):  enoxaparin Injectable 40 milliGRAM(s) SubCutaneous every 24 hours  insulin lispro (HumaLOG) corrective regimen sliding scale   SubCutaneous three times a day before meals  insulin lispro (HumaLOG) corrective regimen sliding scale   SubCutaneous at bedtime  levothyroxine 88 MICROGram(s) Oral daily  melatonin 5 milliGRAM(s) Oral at bedtime  predniSONE   Tablet 40 milliGRAM(s) Oral two times a day  sertraline 50 milliGRAM(s) Oral daily  trimethoprim / sulfamethoxazole IVPB      trimethoprim / sulfamethoxazole IVPB 400 milliGRAM(s) IV Intermittent every 8 hours    MEDICATIONS  (PRN):  acetaminophen   Tablet. 650 milliGRAM(s) Oral every 6 hours PRN mild or moderate pain  ondansetron    Tablet 4 milliGRAM(s) Oral every 4 hours PRN Nausea and/or Vomiting  QUEtiapine 25 milliGRAM(s) Oral every 6 hours PRN agitation, anxiety, insomnia        CAPILLARY BLOOD GLUCOSE      POCT Blood Glucose.: 127 mg/dL (23 Jan 2018 21:57)  POCT Blood Glucose.: 164 mg/dL (23 Jan 2018 17:19)    I&O's Summary    23 Jan 2018 07:01  -  24 Jan 2018 07:00  --------------------------------------------------------  IN: 525 mL / OUT: 700 mL / NET: -175 mL    24 Jan 2018 07:01  -  24 Jan 2018 09:53  --------------------------------------------------------  IN: 0 mL / OUT: 0 mL / NET: 0 mL        PHYSICAL EXAM:  Vital Signs Last 24 Hrs  T(C): 36.8 (24 Jan 2018 07:10), Max: 37.1 (23 Jan 2018 16:12)  T(F): 98.3 (24 Jan 2018 07:10), Max: 98.7 (23 Jan 2018 16:12)  HR: 87 (24 Jan 2018 07:10) (83 - 92)  BP: 151/83 (24 Jan 2018 07:10) (122/80 - 151/83)  BP(mean): --  RR: 18 (24 Jan 2018 07:10) (18 - 20)  SpO2: 94% (24 Jan 2018 07:10) (92% - 94%)    GENERAL: tearful, not in respiratory distress  HEAD/FACE:  Atraumatic, Normocephalic  EYES: EOMI, PERRLA, conjunctiva and sclera clear  NECK: Supple, No JVD  CHEST/LUNG: Clear to auscultation bilaterally; No wheezes or rales  HEART: Regular rate and rhythm; No murmurs, rubs, or gallops  ABDOMEN: Soft, Nontender, Nondistended; Bowel sounds present  BACK: ttp of paraspinal muscles in thoracic region, ttp at SI joints, no tenderness on spine  EXTREMITIES:  2+ Peripheral Pulses, No clubbing, cyanosis, or edema, tenderness to palpation of b/l calves  PSYCH: AAOx3  NEUROLOGY: non-focal  SKIN: slight puffiness to b/l cheeks under eyes    LABS:  (01-24 @ 07:35)                        12.1  13.0 )-----------( 270                 33.0    Neutrophils = -- (--%)  Lymphocytes = -- (--%)  Eosinophils = -- (--%)  Basophils = -- (--%)  Monocytes = -- (--%)  Bands = --%    WBC Trend: 13.0<--, 7.96<--, 8.10<--  Hb Trend: 12.1<--, 11.3<--, 12.5<--, 13.1<--, 13.9<--  Plt Trend: 270<--, 159<--, 197<--, 198<--, 180<--  01-24    135  |  99  |  11  ----------------------------<  192<H>  3.9   |  20<L>  |  1.05    Ca    9.1      24 Jan 2018 07:35  Phos  2.7     01-22  Mg     2.1     01-22      Creatinine Trend: 1.05<--, 0.96<--, 0.89<--, 0.81<--, 0.87<--  ( 24 Jan 2018 07:35 )   PT: 11.4 sec;   INR: 1.04 ratio;       PTT:26.6 sec  CARDIAC MARKERS ( 24 Jan 2018 07:35 )  Trop x     / CK 66 U/L / CKMB x               Microbiology:  Urine Cx:  Blood Cx:    RADIOLOGY & ADDITIONAL TESTS:  X- Ray:  CT:  Ultrasound:  [ ] imaging personally reviewed and interpreted by me    Consultant(s) Notes Reviewed:      Care Discussed with Consultants/Other Providers: Patient is a 67y old  Female who presents with a chief complaint of SOB, palpitations (20 Jan 2018 14:58)      SUBJECTIVE / OVERNIGHT EVENTS: No acute events overnight. patient reports SOB has slightly improved. Continues to be anxious about what is going on. Explained current treatment and working diagnosis. Denies CP, abdominal pain. Reports she feels it is hard to swallow and would like liquid diet.     MEDICATIONS  (STANDING):  enoxaparin Injectable 40 milliGRAM(s) SubCutaneous every 24 hours  insulin lispro (HumaLOG) corrective regimen sliding scale   SubCutaneous three times a day before meals  insulin lispro (HumaLOG) corrective regimen sliding scale   SubCutaneous at bedtime  levothyroxine 88 MICROGram(s) Oral daily  melatonin 5 milliGRAM(s) Oral at bedtime  predniSONE   Tablet 40 milliGRAM(s) Oral two times a day  sertraline 50 milliGRAM(s) Oral daily  trimethoprim / sulfamethoxazole IVPB      trimethoprim / sulfamethoxazole IVPB 400 milliGRAM(s) IV Intermittent every 8 hours    MEDICATIONS  (PRN):  acetaminophen   Tablet. 650 milliGRAM(s) Oral every 6 hours PRN mild or moderate pain  ondansetron    Tablet 4 milliGRAM(s) Oral every 4 hours PRN Nausea and/or Vomiting  QUEtiapine 25 milliGRAM(s) Oral every 6 hours PRN agitation, anxiety, insomnia        CAPILLARY BLOOD GLUCOSE      POCT Blood Glucose.: 127 mg/dL (23 Jan 2018 21:57)  POCT Blood Glucose.: 164 mg/dL (23 Jan 2018 17:19)    I&O's Summary    23 Jan 2018 07:01  -  24 Jan 2018 07:00  --------------------------------------------------------  IN: 525 mL / OUT: 700 mL / NET: -175 mL    24 Jan 2018 07:01  -  24 Jan 2018 09:53  --------------------------------------------------------  IN: 0 mL / OUT: 0 mL / NET: 0 mL        PHYSICAL EXAM:  Vital Signs Last 24 Hrs  T(C): 36.8 (24 Jan 2018 07:10), Max: 37.1 (23 Jan 2018 16:12)  T(F): 98.3 (24 Jan 2018 07:10), Max: 98.7 (23 Jan 2018 16:12)  HR: 87 (24 Jan 2018 07:10) (83 - 92)  BP: 151/83 (24 Jan 2018 07:10) (122/80 - 151/83)  BP(mean): --  RR: 18 (24 Jan 2018 07:10) (18 - 20)  SpO2: 94% (24 Jan 2018 07:10) (92% - 94%)    GENERAL: tearful, not in respiratory distress  HEAD/FACE:  Atraumatic, Normocephalic  EYES: EOMI, PERRLA, conjunctiva and sclera clear  NECK: Supple, No JVD  CHEST/LUNG: Clear to auscultation bilaterally; No wheezes or rales  HEART: Regular rate and rhythm; No murmurs, rubs, or gallops  ABDOMEN: Soft, Nontender, Nondistended; Bowel sounds present  BACK: ttp of paraspinal muscles in thoracic region, ttp at SI joints, no tenderness on spine  EXTREMITIES:  2+ Peripheral Pulses, No clubbing, cyanosis, or edema, tenderness to palpation of b/l calves  PSYCH: AAOx3  NEUROLOGY: non-focal  SKIN: slight puffiness to b/l cheeks under eyes    LABS:  (01-24 @ 07:35)                        12.1  13.0 )-----------( 270                 33.0    Neutrophils = -- (--%)  Lymphocytes = -- (--%)  Eosinophils = -- (--%)  Basophils = -- (--%)  Monocytes = -- (--%)  Bands = --%    WBC Trend: 13.0<--, 7.96<--, 8.10<--  Hb Trend: 12.1<--, 11.3<--, 12.5<--, 13.1<--, 13.9<--  Plt Trend: 270<--, 159<--, 197<--, 198<--, 180<--  01-24    135  |  99  |  11  ----------------------------<  192<H>  3.9   |  20<L>  |  1.05    Ca    9.1      24 Jan 2018 07:35  Phos  2.7     01-22  Mg     2.1     01-22      Creatinine Trend: 1.05<--, 0.96<--, 0.89<--, 0.81<--, 0.87<--  ( 24 Jan 2018 07:35 )   PT: 11.4 sec;   INR: 1.04 ratio;       PTT:26.6 sec  CARDIAC MARKERS ( 24 Jan 2018 07:35 )  Trop x     / CK 66 U/L / CKMB x               Microbiology:  Urine Cx:  Blood Cx:    RADIOLOGY & ADDITIONAL TESTS:  X- Ray:  CT:  Ultrasound:  [ ] imaging personally reviewed and interpreted by me    Consultant(s) Notes Reviewed:      Care Discussed with Consultants/Other Providers:

## 2018-01-24 NOTE — PROGRESS NOTE ADULT - PROBLEM SELECTOR PLAN 6
- new onset with A1C here 6.7  - could be 2/2 prednisone use, glucose on BMP well controlled, will monitor

## 2018-01-24 NOTE — PROGRESS NOTE ADULT - ASSESSMENT
67F with situs inversus,  hyperlipidemia, hypothyroidism, was started on Crestor recently and developed muscle pain 11/2017and was seen by rheum, was considered PMR and giant cell arthritis in view of scalp tenderness, so was started on prednisone 60 and underwent temporal artery biopsy which was negative. The rheumatologist started to taper the prednisone but patient believes when she discontinued the Crestor her muscle pain resolved. 2 weeks into steroids she started to develop dyspnea with palpitation and fatigue. She denied fever, chills, cough, weight loss but she had altered mental status with a change in her a smelling, bleeding from the nose and night sweats.  She was born in [place] no recent travel, last travel was 2000 to [place], lives with her mother, has cats and used to had birds ( in parrots family) before.  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  She has strong family history of auto immune disorders like RA, mixed connective tissue disorder or Rheynaud    dyspnea with hypoxia, b/l groundglass opacities, epistaxis, ? AMS with impairment in taste and smell, ? recent DX of PML, giant cell arthritis with negative temporal artery BX on tapering steroids  strong family history of auto immune disorders, used to have birds, from [place]  negative ANCA, but atypical ANCA: indeterminate, OLEKSANDR: 1: 160 nuclear,   OLEKSANDR Pattern: Nuclear Dots  ESR: 44, CRP: 8.60  auto immune/Wegener's   vs infectious like PCP, fungal  imagings were discussed with radiology which stated that PCP is more likely in the picture    c/w bactrim 15 mg/kg divided to q8   bronch and biopsy today  brain MRI  f/u  urine histo Ag and serum Ab  f/u galactomannan  f/u rheum w/u

## 2018-01-25 LAB
ANION GAP SERPL CALC-SCNC: 13 MMOL/L — SIGNIFICANT CHANGE UP (ref 5–17)
BASOPHILS # BLD AUTO: 0 K/UL — SIGNIFICANT CHANGE UP (ref 0–0.2)
BASOPHILS NFR BLD AUTO: 0 % — SIGNIFICANT CHANGE UP (ref 0–2)
BUN SERPL-MCNC: 12 MG/DL — SIGNIFICANT CHANGE UP (ref 7–23)
CALCIUM SERPL-MCNC: 9.2 MG/DL — SIGNIFICANT CHANGE UP (ref 8.4–10.5)
CHLORIDE SERPL-SCNC: 99 MMOL/L — SIGNIFICANT CHANGE UP (ref 96–108)
CO2 SERPL-SCNC: 23 MMOL/L — SIGNIFICANT CHANGE UP (ref 22–31)
CREAT SERPL-MCNC: 0.98 MG/DL — SIGNIFICANT CHANGE UP (ref 0.5–1.3)
EOSINOPHIL # BLD AUTO: 0 K/UL — SIGNIFICANT CHANGE UP (ref 0–0.5)
EOSINOPHIL NFR BLD AUTO: 0 % — SIGNIFICANT CHANGE UP (ref 0–6)
FUNGITELL: >500 PG/ML — HIGH (ref 0–59)
GLUCOSE BLDC GLUCOMTR-MCNC: 100 MG/DL — HIGH (ref 70–99)
GLUCOSE BLDC GLUCOMTR-MCNC: 103 MG/DL — HIGH (ref 70–99)
GLUCOSE BLDC GLUCOMTR-MCNC: 136 MG/DL — HIGH (ref 70–99)
GLUCOSE BLDC GLUCOMTR-MCNC: 171 MG/DL — HIGH (ref 70–99)
GLUCOSE SERPL-MCNC: 93 MG/DL — SIGNIFICANT CHANGE UP (ref 70–99)
HCT VFR BLD CALC: 34.2 % — LOW (ref 34.5–45)
HGB BLD-MCNC: 11.8 G/DL — SIGNIFICANT CHANGE UP (ref 11.5–15.5)
LYMPHOCYTES # BLD AUTO: 18 % — SIGNIFICANT CHANGE UP (ref 13–44)
LYMPHOCYTES # BLD AUTO: 2.56 K/UL — SIGNIFICANT CHANGE UP (ref 1–3.3)
MCHC RBC-ENTMCNC: 31.9 PG — SIGNIFICANT CHANGE UP (ref 27–34)
MCHC RBC-ENTMCNC: 34.5 GM/DL — SIGNIFICANT CHANGE UP (ref 32–36)
MCV RBC AUTO: 92.4 FL — SIGNIFICANT CHANGE UP (ref 80–100)
MONOCYTES # BLD AUTO: 0.71 K/UL — SIGNIFICANT CHANGE UP (ref 0–0.9)
MONOCYTES NFR BLD AUTO: 5 % — SIGNIFICANT CHANGE UP (ref 2–14)
NEUTROPHILS # BLD AUTO: 10.1 K/UL — HIGH (ref 1.8–7.4)
NEUTROPHILS NFR BLD AUTO: 71 % — SIGNIFICANT CHANGE UP (ref 43–77)
NIGHT BLUE STAIN TISS: SIGNIFICANT CHANGE UP
NON-GYNECOLOGICAL CYTOLOGY STUDY: SIGNIFICANT CHANGE UP
NON-GYNECOLOGICAL CYTOLOGY STUDY: SIGNIFICANT CHANGE UP
PLATELET # BLD AUTO: 191 K/UL — SIGNIFICANT CHANGE UP (ref 150–400)
POTASSIUM SERPL-MCNC: 5.3 MMOL/L — SIGNIFICANT CHANGE UP (ref 3.5–5.3)
POTASSIUM SERPL-SCNC: 5.3 MMOL/L — SIGNIFICANT CHANGE UP (ref 3.5–5.3)
RBC # BLD: 3.7 M/UL — LOW (ref 3.8–5.2)
RBC # FLD: 15.7 % — HIGH (ref 10.3–14.5)
SODIUM SERPL-SCNC: 135 MMOL/L — SIGNIFICANT CHANGE UP (ref 135–145)
SPECIMEN SOURCE: SIGNIFICANT CHANGE UP
WBC # BLD: 14.23 K/UL — HIGH (ref 3.8–10.5)
WBC # FLD AUTO: 14.23 K/UL — HIGH (ref 3.8–10.5)

## 2018-01-25 PROCEDURE — 99233 SBSQ HOSP IP/OBS HIGH 50: CPT | Mod: GC

## 2018-01-25 PROCEDURE — 99232 SBSQ HOSP IP/OBS MODERATE 35: CPT

## 2018-01-25 PROCEDURE — 99233 SBSQ HOSP IP/OBS HIGH 50: CPT

## 2018-01-25 RX ORDER — ACETAMINOPHEN 500 MG
1000 TABLET ORAL ONCE
Qty: 0 | Refills: 0 | Status: COMPLETED | OUTPATIENT
Start: 2018-01-25 | End: 2018-01-25

## 2018-01-25 RX ADMIN — Medication 650 MILLIGRAM(S): at 16:00

## 2018-01-25 RX ADMIN — Medication 525 MILLIGRAM(S): at 14:45

## 2018-01-25 RX ADMIN — Medication 400 MILLIGRAM(S): at 21:16

## 2018-01-25 RX ADMIN — Medication 1000 MILLIGRAM(S): at 21:40

## 2018-01-25 RX ADMIN — Medication 88 MICROGRAM(S): at 06:50

## 2018-01-25 RX ADMIN — Medication 40 MILLIGRAM(S): at 18:33

## 2018-01-25 RX ADMIN — Medication 525 MILLIGRAM(S): at 22:51

## 2018-01-25 RX ADMIN — Medication 1: at 08:54

## 2018-01-25 RX ADMIN — ONDANSETRON 4 MILLIGRAM(S): 8 TABLET, FILM COATED ORAL at 20:00

## 2018-01-25 RX ADMIN — ENOXAPARIN SODIUM 40 MILLIGRAM(S): 100 INJECTION SUBCUTANEOUS at 06:51

## 2018-01-25 RX ADMIN — SODIUM CHLORIDE 75 MILLILITER(S): 9 INJECTION INTRAMUSCULAR; INTRAVENOUS; SUBCUTANEOUS at 08:54

## 2018-01-25 RX ADMIN — SERTRALINE 50 MILLIGRAM(S): 25 TABLET, FILM COATED ORAL at 12:31

## 2018-01-25 RX ADMIN — Medication 40 MILLIGRAM(S): at 06:50

## 2018-01-25 RX ADMIN — Medication 5 MILLIGRAM(S): at 00:22

## 2018-01-25 RX ADMIN — Medication 525 MILLIGRAM(S): at 06:50

## 2018-01-25 RX ADMIN — Medication 650 MILLIGRAM(S): at 16:45

## 2018-01-25 NOTE — SWALLOW BEDSIDE ASSESSMENT ADULT - SWALLOW EVAL: RECOMMENDED DIET
For limited trials, Pt appears to tolerate puree consistency and thin liquids with no overt s/s laryngeal penetration/aspiration.

## 2018-01-25 NOTE — PROGRESS NOTE ADULT - ASSESSMENT
67F with situs inversus,  hyperlipidemia, hypothyroidism, was started on Crestor recently and developed muscle pain 11/2017and was seen by rheum, was considered PMR and giant cell arthritis in view of scalp tenderness, so was started on prednisone 60 and underwent temporal artery biopsy which was negative. The rheumatologist started to taper the prednisone but patient believes when she discontinued the Crestor her muscle pain resolved. 2 weeks into steroids she started to develop dyspnea with palpitation and fatigue. She denied fever, chills, cough, weight loss but she had altered mental status with a change in her a smelling, bleeding from the nose and night sweats.  She was born in [place] no recent travel, last travel was 2000 to [place], lives with her mother, has cats and used to had birds ( in parrots family) before.  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  She has strong family history of auto immune disorders like RA, mixed connective tissue disorder or Rheynaud    dyspnea with hypoxia, b/l groundglass opacities, epistaxis, ? AMS with impairment in taste and smell, ? recent DX of PML, giant cell arthritis with negative temporal artery BX on tapering steroids  strong family history of auto immune disorders, used to have birds, from [place]  negative ANCA, but atypical ANCA: indeterminate, OLEKSANDR: 1: 160 nuclear,   OLEKSANDR Pattern: Nuclear Dots  ESR: 44, CRP: 8.60  urine histo Ag: negative, fungitail>500, BAL AFB negative, gram stain negative, fungal in progress but the PCP PCR was not done  auto immune/Wegener's   vs infectious like PCP, fungal  imagings were discussed with radiology which stated that PCP is more likely in the picture    c/w bactrim 15 mg/kg divided to q8  f/u  BAL studies if there is any specimen left, please send it  for PCP PCR  f/u galactomannan  f/u rheum w/u

## 2018-01-25 NOTE — PROGRESS NOTE ADULT - SUBJECTIVE AND OBJECTIVE BOX
Events noted. Pt. offers no complaints other than decreased sleep from noisy roommate. She has not required prn Seroquel. Has an appetite. O2 sats have been in the 90s over the past 24 hours.       Vital Signs Last 24 Hrs  T(C): 36.5 (25 Jan 2018 07:27), Max: 36.7 (24 Jan 2018 14:11)  T(F): 97.7 (25 Jan 2018 07:27), Max: 98.1 (24 Jan 2018 14:11)  HR: 73 (25 Jan 2018 07:27) (73 - 89)  BP: 129/83 (25 Jan 2018 07:27) (119/70 - 134/84)  BP(mean): --  RR: 18 (25 Jan 2018 07:27) (18 - 18)  SpO2: 98% (25 Jan 2018 07:27) (92% - 98%)                          12.1   13.0  )-----------( 270      ( 24 Jan 2018 07:35 )             33.0       01-24    135  |  99  |  11  ----------------------------<  192<H>  3.9   |  20<L>  |  1.05    Ca    9.1      24 Jan 2018 07:35                MEDICATIONS  (STANDING):  enoxaparin Injectable 40 milliGRAM(s) SubCutaneous every 24 hours  insulin lispro (HumaLOG) corrective regimen sliding scale   SubCutaneous three times a day before meals  insulin lispro (HumaLOG) corrective regimen sliding scale   SubCutaneous at bedtime  levothyroxine 88 MICROGram(s) Oral daily  melatonin 5 milliGRAM(s) Oral at bedtime  predniSONE   Tablet 40 milliGRAM(s) Oral two times a day  sertraline 50 milliGRAM(s) Oral daily  trimethoprim / sulfamethoxazole IVPB      trimethoprim / sulfamethoxazole IVPB 400 milliGRAM(s) IV Intermittent every 8 hours    MEDICATIONS  (PRN):  acetaminophen   Tablet. 650 milliGRAM(s) Oral every 6 hours PRN mild or moderate pain  ondansetron    Tablet 4 milliGRAM(s) Oral every 4 hours PRN Nausea and/or Vomiting  QUEtiapine 25 milliGRAM(s) Oral every 6 hours PRN agitation, anxiety, insomnia    < from: MR Head w/wo IV Cont (01.23.18 @ 13:52) >  EXAM:  MR BRAIN WAW IC                            PROCEDURE DATE:  01/23/2018            INTERPRETATION:    CLINICAL INDICATION: On chronic steroids with headache, altered mental   status      Magnetic resonance imaging of the brain was carried outwith transaxial   SPGR, FLAIR, fast spin echo T2 weighted images, axial gradient echo   series, diffusion weighted series and sagittal T1 weighted series on a   1.5 Nubia magnet. Post contrast axial, coronal and sagittal T1 weighted   images were obtained. 7.5 cc of Gadavist were intravenously injected, 0   cc were discarded.      No prior brain imaging is available for comparison    The fourth, third and lateral ventricles are normal in size and position.   There is no hemorrhage, mass or shift of the midline structures. Minimal   small vessel white matter ischemic changes are noted. No acute infarcts   or hemorrhage are identified.    After contrast infusion there is normal vascular enhancement. No abnormal   parenchymal or leptomeningeal enhancement is seen.      IMPRESSION: Minimal small vessel white matter ischemic changes. No acute   infarcts, hemorrhage or mass. No abnormal enhancement.            < end of copied text >      WF in bed sitting up eating breakfast, calm, cooperative, alert and oriented x 3  .  No psychomotor abnormalities. Insight and judgment are fair. Speech is coherent with normal rate and volume. No hallucinations nor delusions. The patient denied suicidal and homicidal ideation and plan. Mood is "ok" and affect mildly constricted. Attention and concentration, short term memory, and long term memory within normal limits.

## 2018-01-25 NOTE — PROGRESS NOTE ADULT - ASSESSMENT
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 Patient's hypoxia in setting of high dose steroid use likely from PCP infection, being treated with bactrim and steroids with slow improvement.   Vasculitis/ interstitial lung disease/ hypersensitivity pneumonitis also possibility but less likely as symptoms developed when patient was on high dose steroids, but again patient improving when steroid dose increased, no cannot completely rule out at present.     S/p bronchoscopy with BAL and transbronchial biopsy 01/24. Differential cell count from BAL with lymphocyte predominant leukocytosis - still non specific. Follow final results from histopathology and continue bactrim and prednisone for now.

## 2018-01-25 NOTE — SWALLOW BEDSIDE ASSESSMENT ADULT - SLP GENERAL OBSERVATIONS
Pt awake, alert, able to follow commands and answer questions appropriately, fluent verbal output. Pt reported change in vocal quality with reported higher pitch and lower vocal intensity compared to baseline.

## 2018-01-25 NOTE — SWALLOW BEDSIDE ASSESSMENT ADULT - SWALLOW EVAL: PATIENT/FAMILY GOALS STATEMENT
Pt reports an episode 2 days ago when she "couldn't swallow" when eating a small bite of a cracker. Pt reports sensation of pharyngeal retention followed by "dry heaves" and on and off nausea. When asked if she has difficulty swallowing pills, she reported, "I kind of know how to get them down." Pt reports exaggerated gag reflex, resulting in difficulty brushing her teeth. Pt reports that she believes her symptoms are possibly due to taking steroids on an empty stomach.

## 2018-01-25 NOTE — PROGRESS NOTE ADULT - SUBJECTIVE AND OBJECTIVE BOX
Patient is a 67y old  Female who presents with a chief complaint of SOB, palpitations (20 Jan 2018 14:58)      SUBJECTIVE / OVERNIGHT EVENTS: No acute events overnight.     MEDICATIONS  (STANDING):  enoxaparin Injectable 40 milliGRAM(s) SubCutaneous every 24 hours  insulin lispro (HumaLOG) corrective regimen sliding scale   SubCutaneous three times a day before meals  insulin lispro (HumaLOG) corrective regimen sliding scale   SubCutaneous at bedtime  levothyroxine 88 MICROGram(s) Oral daily  melatonin 5 milliGRAM(s) Oral at bedtime  predniSONE   Tablet 40 milliGRAM(s) Oral two times a day  sertraline 50 milliGRAM(s) Oral daily  sodium chloride 0.9%. 1000 milliLiter(s) (75 mL/Hr) IV Continuous <Continuous>  trimethoprim / sulfamethoxazole IVPB      trimethoprim / sulfamethoxazole IVPB 400 milliGRAM(s) IV Intermittent every 8 hours    MEDICATIONS  (PRN):  acetaminophen   Tablet. 650 milliGRAM(s) Oral every 6 hours PRN mild or moderate pain  ondansetron    Tablet 4 milliGRAM(s) Oral every 4 hours PRN Nausea and/or Vomiting  QUEtiapine 25 milliGRAM(s) Oral every 6 hours PRN agitation, anxiety, insomnia        CAPILLARY BLOOD GLUCOSE      POCT Blood Glucose.: 96 mg/dL (24 Jan 2018 21:23)  POCT Blood Glucose.: 119 mg/dL (24 Jan 2018 16:52)    I&O's Summary    24 Jan 2018 07:01  -  25 Jan 2018 07:00  --------------------------------------------------------  IN: 1245 mL / OUT: 0 mL / NET: 1245 mL        PHYSICAL EXAM:  Vital Signs Last 24 Hrs  T(C): 36.5 (25 Jan 2018 07:27), Max: 36.7 (24 Jan 2018 14:11)  T(F): 97.7 (25 Jan 2018 07:27), Max: 98.1 (24 Jan 2018 14:11)  HR: 73 (25 Jan 2018 07:27) (73 - 89)  BP: 129/83 (25 Jan 2018 07:27) (119/70 - 134/84)  BP(mean): --  RR: 18 (25 Jan 2018 07:27) (18 - 18)  SpO2: 98% (25 Jan 2018 07:27) (92% - 98%)    GENERAL: tearful, not in respiratory distress  HEAD/FACE:  Atraumatic, Normocephalic  EYES: EOMI, PERRLA, conjunctiva and sclera clear  NECK: Supple, No JVD  CHEST/LUNG: Clear to auscultation bilaterally; No wheezes or rales  HEART: Regular rate and rhythm; No murmurs, rubs, or gallops  ABDOMEN: Soft, Nontender, Nondistended; Bowel sounds present  BACK: ttp of paraspinal muscles in thoracic region, ttp at SI joints, no tenderness on spine  EXTREMITIES:  2+ Peripheral Pulses, No clubbing, cyanosis, or edema, tenderness to palpation of b/l calves  PSYCH: AAOx3  NEUROLOGY: non-focal  SKIN: no rashes or lesions, redness on face resolved    LABS:    WBC Trend: 13.0<--, 7.96<--, 8.10<--  Hb Trend: 12.1<--, 11.3<--, 12.5<--, 13.1<--, 13.9<--  Plt Trend: 270<--, 159<--, 197<--, 198<--, 180<--  01-24    135  |  99  |  11  ----------------------------<  192<H>  3.9   |  20<L>  |  1.05    Ca    9.1      24 Jan 2018 07:35      Creatinine Trend: 1.05<--, 0.96<--, 0.89<--, 0.81<--, 0.87<--  ( 24 Jan 2018 07:35 )   PT: 11.4 sec;   INR: 1.04 ratio;       PTT:26.6 sec  CARDIAC MARKERS ( 24 Jan 2018 07:35 )  Trop x     / CK 66 U/L / CKMB x               Microbiology:  Urine Cx:  Blood Cx:    RADIOLOGY & ADDITIONAL TESTS:  X- Ray:  CT:  Ultrasound:  [ ] imaging personally reviewed and interpreted by me    Consultant(s) Notes Reviewed:      Care Discussed with Consultants/Other Providers: Patient is a 67y old  Female who presents with a chief complaint of SOB, palpitations (20 Jan 2018 14:58)      SUBJECTIVE / OVERNIGHT EVENTS: No acute events overnight. This morning patient states SOB has improved slightly but she still feels unwell. She states that it feels like its hard to swallow and that when she does the food will come back up. Reports that its not a nauseating    MEDICATIONS  (STANDING):  enoxaparin Injectable 40 milliGRAM(s) SubCutaneous every 24 hours  insulin lispro (HumaLOG) corrective regimen sliding scale   SubCutaneous three times a day before meals  insulin lispro (HumaLOG) corrective regimen sliding scale   SubCutaneous at bedtime  levothyroxine 88 MICROGram(s) Oral daily  melatonin 5 milliGRAM(s) Oral at bedtime  predniSONE   Tablet 40 milliGRAM(s) Oral two times a day  sertraline 50 milliGRAM(s) Oral daily  sodium chloride 0.9%. 1000 milliLiter(s) (75 mL/Hr) IV Continuous <Continuous>  trimethoprim / sulfamethoxazole IVPB      trimethoprim / sulfamethoxazole IVPB 400 milliGRAM(s) IV Intermittent every 8 hours    MEDICATIONS  (PRN):  acetaminophen   Tablet. 650 milliGRAM(s) Oral every 6 hours PRN mild or moderate pain  ondansetron    Tablet 4 milliGRAM(s) Oral every 4 hours PRN Nausea and/or Vomiting  QUEtiapine 25 milliGRAM(s) Oral every 6 hours PRN agitation, anxiety, insomnia        CAPILLARY BLOOD GLUCOSE      POCT Blood Glucose.: 96 mg/dL (24 Jan 2018 21:23)  POCT Blood Glucose.: 119 mg/dL (24 Jan 2018 16:52)    I&O's Summary    24 Jan 2018 07:01  -  25 Jan 2018 07:00  --------------------------------------------------------  IN: 1245 mL / OUT: 0 mL / NET: 1245 mL        PHYSICAL EXAM:  Vital Signs Last 24 Hrs  T(C): 36.5 (25 Jan 2018 07:27), Max: 36.7 (24 Jan 2018 14:11)  T(F): 97.7 (25 Jan 2018 07:27), Max: 98.1 (24 Jan 2018 14:11)  HR: 73 (25 Jan 2018 07:27) (73 - 89)  BP: 129/83 (25 Jan 2018 07:27) (119/70 - 134/84)  BP(mean): --  RR: 18 (25 Jan 2018 07:27) (18 - 18)  SpO2: 98% (25 Jan 2018 07:27) (92% - 98%)    GENERAL: tearful, not in respiratory distress  HEAD/FACE:  Atraumatic, Normocephalic  EYES: EOMI, PERRLA, conjunctiva and sclera clear  NECK: Supple, No JVD  CHEST/LUNG: Clear to auscultation bilaterally; No wheezes or rales  HEART: Regular rate and rhythm; No murmurs, rubs, or gallops  ABDOMEN: Soft, Nontender, Nondistended; Bowel sounds present  BACK: ttp of paraspinal muscles in thoracic region, ttp at SI joints, no tenderness on spine  EXTREMITIES:  2+ Peripheral Pulses, No clubbing, cyanosis, or edema, tenderness to palpation of b/l calves  PSYCH: AAOx3  NEUROLOGY: non-focal  SKIN: no rashes or lesions, redness on face resolved    LABS:    WBC Trend: 13.0<--, 7.96<--, 8.10<--  Hb Trend: 12.1<--, 11.3<--, 12.5<--, 13.1<--, 13.9<--  Plt Trend: 270<--, 159<--, 197<--, 198<--, 180<--  01-24    135  |  99  |  11  ----------------------------<  192<H>  3.9   |  20<L>  |  1.05    Ca    9.1      24 Jan 2018 07:35      Creatinine Trend: 1.05<--, 0.96<--, 0.89<--, 0.81<--, 0.87<--  ( 24 Jan 2018 07:35 )   PT: 11.4 sec;   INR: 1.04 ratio;       PTT:26.6 sec  CARDIAC MARKERS ( 24 Jan 2018 07:35 )  Trop x     / CK 66 U/L / CKMB x               Microbiology:  Urine Cx:  Blood Cx:    RADIOLOGY & ADDITIONAL TESTS:  X- Ray:  CT:  Ultrasound:  [ ] imaging personally reviewed and interpreted by me    Consultant(s) Notes Reviewed:      Care Discussed with Consultants/Other Providers: Patient is a 67y old  Female who presents with a chief complaint of SOB, palpitations (20 Jan 2018 14:58)      SUBJECTIVE / OVERNIGHT EVENTS: No acute events overnight. This morning patient states SOB has improved slightly but she still feels unwell. She states that it feels like its hard to swallow and that when she does the food will come back up. Reports that its not a nauseated feeling. ALso reports a continued change in taste. Denies HA, cough, abdominal pain, constipation, diarrhea.     MEDICATIONS  (STANDING):  enoxaparin Injectable 40 milliGRAM(s) SubCutaneous every 24 hours  insulin lispro (HumaLOG) corrective regimen sliding scale   SubCutaneous three times a day before meals  insulin lispro (HumaLOG) corrective regimen sliding scale   SubCutaneous at bedtime  levothyroxine 88 MICROGram(s) Oral daily  melatonin 5 milliGRAM(s) Oral at bedtime  predniSONE   Tablet 40 milliGRAM(s) Oral two times a day  sertraline 50 milliGRAM(s) Oral daily  sodium chloride 0.9%. 1000 milliLiter(s) (75 mL/Hr) IV Continuous <Continuous>  trimethoprim / sulfamethoxazole IVPB      trimethoprim / sulfamethoxazole IVPB 400 milliGRAM(s) IV Intermittent every 8 hours    MEDICATIONS  (PRN):  acetaminophen   Tablet. 650 milliGRAM(s) Oral every 6 hours PRN mild or moderate pain  ondansetron    Tablet 4 milliGRAM(s) Oral every 4 hours PRN Nausea and/or Vomiting  QUEtiapine 25 milliGRAM(s) Oral every 6 hours PRN agitation, anxiety, insomnia        CAPILLARY BLOOD GLUCOSE      POCT Blood Glucose.: 96 mg/dL (24 Jan 2018 21:23)  POCT Blood Glucose.: 119 mg/dL (24 Jan 2018 16:52)    I&O's Summary    24 Jan 2018 07:01  -  25 Jan 2018 07:00  --------------------------------------------------------  IN: 1245 mL / OUT: 0 mL / NET: 1245 mL        PHYSICAL EXAM:  Vital Signs Last 24 Hrs  T(C): 36.5 (25 Jan 2018 07:27), Max: 36.7 (24 Jan 2018 14:11)  T(F): 97.7 (25 Jan 2018 07:27), Max: 98.1 (24 Jan 2018 14:11)  HR: 73 (25 Jan 2018 07:27) (73 - 89)  BP: 129/83 (25 Jan 2018 07:27) (119/70 - 134/84)  BP(mean): --  RR: 18 (25 Jan 2018 07:27) (18 - 18)  SpO2: 98% (25 Jan 2018 07:27) (92% - 98%)    GENERAL: tearful, not in respiratory distress  HEAD/FACE:  Atraumatic, Normocephalic  EYES: EOMI, PERRLA, conjunctiva and sclera clear  NECK: Supple, No JVD  CHEST/LUNG: Clear to auscultation bilaterally; No wheezes or rales  HEART: Regular rate and rhythm; No murmurs, rubs, or gallops  ABDOMEN: Soft, Nontender, Nondistended; Bowel sounds present  BACK: ttp of paraspinal muscles in thoracic region, ttp at SI joints, no tenderness on spine  EXTREMITIES:  2+ Peripheral Pulses, No clubbing, cyanosis, or edema  PSYCH: AAOx3  NEUROLOGY: non-focal  SKIN: no rashes or lesions, redness on face resolved    LABS:    WBC Trend: 13.0<--, 7.96<--, 8.10<--  Hb Trend: 12.1<--, 11.3<--, 12.5<--, 13.1<--, 13.9<--  Plt Trend: 270<--, 159<--, 197<--, 198<--, 180<--  01-24    135  |  99  |  11  ----------------------------<  192<H>  3.9   |  20<L>  |  1.05    Ca    9.1      24 Jan 2018 07:35      Creatinine Trend: 1.05<--, 0.96<--, 0.89<--, 0.81<--, 0.87<--  ( 24 Jan 2018 07:35 )   PT: 11.4 sec;   INR: 1.04 ratio;       PTT:26.6 sec  CARDIAC MARKERS ( 24 Jan 2018 07:35 )  Trop x     / CK 66 U/L / CKMB x               Microbiology:  Urine Cx:  Blood Cx:    RADIOLOGY & ADDITIONAL TESTS:  X- Ray:  CT:  Ultrasound:  [ ] imaging personally reviewed and interpreted by me    Consultant(s) Notes Reviewed:      Care Discussed with Consultants/Other Providers:

## 2018-01-25 NOTE — PROGRESS NOTE ADULT - PROBLEM SELECTOR PLAN 5
- c/w zoloft home dose  - very tearful and anxious this AM, exacerbation of emotion could be 2/2 steroids  - psych consulted, seroquel prn q6 for anxiety, insomnia

## 2018-01-25 NOTE — PROGRESS NOTE ADULT - ASSESSMENT
Less thought disordered/more appropriate today.     Recommend  Continue with current Zoloft and prn Seroquel.  Following.    Roberto Benjamin M.D.  Psychiatry  [phone]

## 2018-01-25 NOTE — ANESTHESIA FOLLOW-UP NOTE - NSEVALATIONFT_GEN_ALL_CORE
No anesthetic complications. Pain controlled, no SOB, no rashes. Patient describes difficulty with swallowing x 2 days. Tolerating liquid diet today. Consider S&S consult if there is no resolution.

## 2018-01-25 NOTE — PROGRESS NOTE ADULT - PROBLEM SELECTOR PLAN 4
- no personal or family history of kidney disease, no history of diabetes  - reports darkening in color of urine, no other symptoms  - f/u rheumatologic w/u as above

## 2018-01-25 NOTE — PROGRESS NOTE ADULT - ASSESSMENT
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

## 2018-01-25 NOTE — SWALLOW BEDSIDE ASSESSMENT ADULT - COMMENTS
Hx cont'd: Symptoms significantly worse since Sunday; cannot recall when she stopped taking her meds but does remember that she has not taken them consistently this week. Reports weakness to point of not being able to get out full sentences and has not had much physical activity.  In ED, vitals were 142/86, 97.2F, HR 91, 18-93%RA. CTA showed b/l groundglass opacities and UA significant for proteinuria. Given ceftriaxone/azithro.  Per Medicine: AAO x 3, tachycardic at 111bpm, RR 18, saturating at 95% on room air. SOB Ddx: infectious vs rheumatologic (vasculitis, connective tissue disease) vs. interstitial lung disease - saturating 93-97% on RA, not tachypneic, + tachycardic, CTA: b/l ground glass opacities, negative for PE; no evidence of fluid overload, ischemic changes on EKG, unlikely cardiac; afebrile, no leukocytosis, unlikely infectious; will resend rheumatologic w/u here given CT findings and family hx.    Per Pulm: hypoxia in setting of high dose steroid use likely from PCP infection, being treated with bactrim, steroids with slow improvement.   Vasculitis/ interstitial lung disease/ hypersensitivity pneumonitis possibility but less likely as symptoms developed when pt on high dose steroids, but pt improving when steroid dose increased, cannot completely r/o at present. S/p bronch with BAL and transbronchial bx 1/24, results pending.  Per ID: dyspnea with hypoxia, b/l groundglass opacities, epistaxis, ? AMS with impairment in taste and smell, ? recent DX of PML, GCA with negative temporal artery BX on tapering steroids, family h/o auto immune disorders, used to have birds, from [place]. auto immune/Wegener's vs infectious like PCP, fungal.  Cards following: Dextrocardia with situs inversus.  MR Brain 1/23 (on chronic steroids with headache, AMS): Minimal small vessel white matter ischemic changes. No acute   infarcts, hemorrhage or mass. No abnormal enhancement.

## 2018-01-25 NOTE — PROGRESS NOTE ADULT - SUBJECTIVE AND OBJECTIVE BOX
Follow Up:  pneumonia    Interval History: pt stable, afebrile, going for bronch and biopsy today    ROS:      All other systems negative    Constitutional: no fever, no chills  HEENT:  no vision changes, no sore throat, no rhinorrhea  Cardiovascular:  no chest pain, no palpitation  Respiratory:  + SOB, no cough  GI:  no abd pain, no vomiting, no diarrhea  urinary: no dysuria, no hematuria, no flank pain  musculoskeletal:  no joint pain, no joint swelling  skin:  no rash  neurology:  no headache, no seizure        Allergies  Vitamin E (Hives)        ANTIMICROBIALS:  trimethoprim / sulfamethoxazole IVPB    trimethoprim / sulfamethoxazole IVPB 400 every 8 hours      OTHER MEDS:  acetaminophen   Tablet. 650 milliGRAM(s) Oral every 6 hours PRN  enoxaparin Injectable 40 milliGRAM(s) SubCutaneous every 24 hours  insulin lispro (HumaLOG) corrective regimen sliding scale   SubCutaneous three times a day before meals  insulin lispro (HumaLOG) corrective regimen sliding scale   SubCutaneous at bedtime  levothyroxine 88 MICROGram(s) Oral daily  melatonin 5 milliGRAM(s) Oral at bedtime  ondansetron    Tablet 4 milliGRAM(s) Oral every 4 hours PRN  predniSONE   Tablet 40 milliGRAM(s) Oral two times a day  QUEtiapine 25 milliGRAM(s) Oral every 6 hours PRN  sertraline 50 milliGRAM(s) Oral daily      Vital Signs Last 24 Hrs  T(C): 36.8 (24 Jan 2018 07:10), Max: 37.1 (23 Jan 2018 16:12)  T(F): 98.3 (24 Jan 2018 07:10), Max: 98.7 (23 Jan 2018 16:12)  HR: 87 (24 Jan 2018 07:10) (83 - 92)  BP: 151/83 (24 Jan 2018 07:10) (122/80 - 151/83)  BP(mean): --  RR: 18 (24 Jan 2018 07:10) (18 - 20)  SpO2: 94% (24 Jan 2018 07:10) (92% - 94%)    Physical Exam:  General:    NAD,  non toxic, A&O x 3  HEENT:    no oropharyngeal lesions,   no LAD,   neck supple  Cardio:     regular S1, S2,  no murmur  Respiratory:    clear b/l,    no wheezing  abd:     soft,   BS +,   no tenderness,    no organomegaly  :   no CVAT,  no suprapubic tenderness,   no  bowser  Musculoskeletal:   no joint swelling,   no edema  vascular: no lines, normal pulses  Skin:    no rash  Neurologic:     no focal deficit  psych: normal affect, no suicidal ideation                          12.1   13.0  )-----------( 270      ( 24 Jan 2018 07:35 )             33.0       01-24    135  |  99  |  11  ----------------------------<  192<H>  3.9   |  20<L>  |  1.05    Ca    9.1      24 Jan 2018 07:35            MICROBIOLOGY:  v      Rapid RVP Result: NotDetec (01-20 @ 14:40)        RADIOLOGY:    < from: CT Angio Chest w/ IV Cont (01.20.18 @ 08:50) >    IMPRESSION:   No pulmonary embolism.    Scattered bilateral perihilar groundglass opacities with interlobular   septal thickening. The differential diagnosis includes  mild pulmonary   edema and pneumonia.    Situs inversus.    Stenosis of the proximal celiac artery.      < from: MR Head w/wo IV Cont (01.23.18 @ 13:52) >    IMPRESSION: Minimal small vessel white matter ischemic changes. No acute   infarcts, hemorrhage or mass. No abnormal enhancement.            IMPRESSION: Minimal small vessel white matter ischemic changes. No acute   infarcts, hemorrhage or mass. No abnormal enhancement.

## 2018-01-25 NOTE — PROGRESS NOTE ADULT - PROBLEM SELECTOR PLAN 1
Hypoxemic at 88% off O2 while ambulating per RN today.  - progressively worsening SOB with palpitations, weakness and fatigue for 1 month, differential includes infectious(?PCP) vs rheumatologic (vasculitis, connective tissue disease) vs. interstitial lung disease  - saturating 93-97% on RA at rest, not tachypneic or tachycardic at this time  - EKG showed sinus tachycardia on admission  - CTA as above, b/l ground glass opacities, negative for PE  - no evidence of fluid overload, ischemic changes on EKG, troponins neg x2, unlikely cardiac  - afebrile, no leukocytosis, however given CT findings and long term steroids, concern for PCP, started on bactrim and high dose steroids for hypoxia  - antiGBM negative, ANCA negative, RF negative, C3 and C4 wnl  - OLEKSANDR 1:160  - pulm consulted - bronch done today for definitive PCP diagnosis  - ID following - MR brain negative Hypoxemic at 88% off O2 while ambulating per RN today.  - progressively worsening SOB with palpitations, weakness and fatigue for 1 month, differential includes infectious(?PCP) vs rheumatologic (vasculitis, connective tissue disease) vs. interstitial lung disease  - saturating 93-97% on RA at rest, not tachypneic or tachycardic at this time  - EKG showed sinus tachycardia on admission  - CTA as above, b/l ground glass opacities, negative for PE  - no evidence of fluid overload, ischemic changes on EKG, troponins neg x2, unlikely cardiac  - afebrile, no leukocytosis, however given CT findings and long term steroids, concern for PCP, started on bactrim and high dose steroids for hypoxia  - antiGBM negative, ANCA negative, RF negative, C3 and C4 wnl  - OLEKSANDR 1:160  - fungitell >500  - pulm consulted - bronch done yesterday, f/u results  - ID following - MR brain negative

## 2018-01-25 NOTE — SWALLOW BEDSIDE ASSESSMENT ADULT - SLP PERTINENT HISTORY OF CURRENT PROBLEM
67F with situs inversus, PMR (Polymyalgia rheumatica) on steroids, HLD, hypothyroidism presenting with 1 mo. progressively worsening SOB, palpitations, weakness, fatigue. In November 2017 started having diffuse sharp muscle pains throughout body, bulging of muscles. Seen by rheumatologist, diagnosed with PMR; temporal artery bx negative for GCA; started on prednisone 6mg in beginning of Dec., tapering down since. Currently on 3mg. In mid December muscle pains and bulging did not improve with steroids so stopped taking crestor, as she has had muscle cramps in past with other statins. After this pains resolved and she has not had any since. However, since starting steroids she has had increasingly worsening SOB, associated with palpitations. Also reports dull L-sided chest pain when SOB that does not radiate. Reports 13-15 pound weight gain in past 1-2 months.

## 2018-01-25 NOTE — SWALLOW BEDSIDE ASSESSMENT ADULT - NS SPL SWALLOW CLINIC TRIAL FT
Pt noted to take a few sips of water without palpation, which appeared timely and GWFL, with no eructation observed. However, upon initiation of structured trials for exam with palpation, Pt noted to exhibit intermittent facial grimacing and eructation. Question behavioral component?

## 2018-01-25 NOTE — PROGRESS NOTE ADULT - SUBJECTIVE AND OBJECTIVE BOX
Subjective: Pt. off O2 with nl saturation. No CP or SOB.  Final bronchoscopy results pending.    MEDICATIONS  (STANDING):  enoxaparin Injectable 40 milliGRAM(s) SubCutaneous every 24 hours  insulin lispro (HumaLOG) corrective regimen sliding scale   SubCutaneous three times a day before meals  insulin lispro (HumaLOG) corrective regimen sliding scale   SubCutaneous at bedtime  levothyroxine 88 MICROGram(s) Oral daily  melatonin 5 milliGRAM(s) Oral at bedtime  predniSONE   Tablet 40 milliGRAM(s) Oral two times a day  sertraline 50 milliGRAM(s) Oral daily  trimethoprim / sulfamethoxazole IVPB      trimethoprim / sulfamethoxazole IVPB 400 milliGRAM(s) IV Intermittent every 8 hours    MEDICATIONS  (PRN):  acetaminophen   Tablet. 650 milliGRAM(s) Oral every 6 hours PRN mild or moderate pain  ondansetron    Tablet 4 milliGRAM(s) Oral every 4 hours PRN Nausea and/or Vomiting  QUEtiapine 25 milliGRAM(s) Oral every 6 hours PRN agitation, anxiety, insomnia      Vital Signs Last 24 Hrs  T(C): 36.5 (25 Jan 2018 07:27), Max: 36.7 (24 Jan 2018 14:11)  T(F): 97.7 (25 Jan 2018 07:27), Max: 98.1 (24 Jan 2018 14:11)  HR: 73 (25 Jan 2018 07:27) (73 - 89)  BP: 129/83 (25 Jan 2018 07:27) (119/70 - 134/84)  BP(mean): --  RR: 18 (25 Jan 2018 07:27) (18 - 18)  SpO2: 98% (25 Jan 2018 07:27) (92% - 98%)    PHYSICAL EXAM:    Constitutional: WD, WN in NAD  Neck: No JVD  Respiratory: Clear lungs  Cardiovascular: RRR, dextrocardia, no murmurs  Extremities: no edema noted  Neurological: grossly nonfocal  Psychiatric: depressed, nl affect    TELEMETRY:    ECG:      LABS:                        11.8   14.23 )-----------( 191      ( 25 Jan 2018 09:04 )             34.2     01-25    135  |  99  |  12  ----------------------------<  93  5.3   |  23  |  0.98    Ca    9.2      25 Jan 2018 09:04      CARDIAC MARKERS ( 24 Jan 2018 07:35 )  x     / x     / 66 U/L / x     / x          PT/INR - ( 24 Jan 2018 07:35 )   PT: 11.4 sec;   INR: 1.04 ratio         PTT - ( 24 Jan 2018 07:35 )  PTT:26.6 sec    I&O's Summary    24 Jan 2018 07:01  -  25 Jan 2018 07:00  --------------------------------------------------------  IN: 1245 mL / OUT: 0 mL / NET: 1245 mL    25 Jan 2018 07:01  -  25 Jan 2018 10:37  --------------------------------------------------------  IN: 320 mL / OUT: 0 mL / NET: 320 mL    Bronchoscopy culture and washings- pending    BNP  RADIOLOGY & ADDITIONAL STUDIES:    IMPRESSION:  SOB with hypoxemia- possible PCP or inflammatory process  HLD  Dextrocardia with situs inversus  Hypothyroidism  ? PMR    RECOMMENDATIONS:  Continue current meds  F/u final results of bronchoscopy cultures and biopsies  Pulm and Rheum f/u

## 2018-01-25 NOTE — SWALLOW BEDSIDE ASSESSMENT ADULT - ASR SWALLOW ASPIRATION MONITOR
pneumonia/throat clearing/upper respiratory infection/change of breathing pattern/gurgly voice/cough/fever/Monitor for s/s aspiration/laryngeal penetration. If noted:  D/C p.o. intake, provide non-oral nutrition/hydration/meds, and contact this service @ [phone]

## 2018-01-25 NOTE — PROGRESS NOTE ADULT - SUBJECTIVE AND OBJECTIVE BOX
CHIEF COMPLAINT:  Shortness of breath    Interval Events:  patient seen and examined this morning  still anxious, having multiple complaints  breathing with slow improvement  RA sats 95% now  still very anxious  underwent bronchoscopy with biopsy and BAL yesterday    REVIEW OF SYSTEMS:  Constitutional: [ ] negative [ ] fevers [ ] chills [x] weight loss [ ] weight gain  HEENT: [x] negative [ ] dry eyes [ ] eye irritation [ ] postnasal drip [ ] nasal congestion  CV: [ ] negative  [ x] chest pain [ x] orthopnea [x] palpitations [ ] murmur  Resp: [ ] negative [ ] cough [x ] shortness of breath [x ] dyspnea [ ] wheezing [ ] sputum [ ] hemoptysis  GI: [x ] negative [ ] nausea [ ] vomiting [ ] diarrhea [ ] constipation [ ] abd pain [ ] dysphagia   : [x ] negative [ ] dysuria [ ] nocturia [ ] hematuria [ ] increased urinary frequency  Musculoskeletal: [ ] negative [ ] back pain [x ] myalgias [ ] arthralgias [ ] fracture  Skin: [ x] negative [ ] rash [ ] itch  Neurological: [x ] negative [ ] headache [ ] dizziness [ ] syncope [ ] weakness [ ] numbness  Psychiatric: [ ] negative [ ] anxiety [ x] depression  Endocrine: [x ] negative [ ] diabetes [ ] thyroid problem  Hematologic/Lymphatic: [x ] negative [ ] anemia [ ] bleeding problem  Allergic/Immunologic: [x ] negative [ ] itchy eyes [ ] nasal discharge [ ] hives [ ] angioedema    OBJECTIVE:  ICU Vital Signs Last 24 Hrs  T(C): 36.5 (25 Jan 2018 07:27), Max: 36.7 (24 Jan 2018 14:11)  T(F): 97.7 (25 Jan 2018 07:27), Max: 98.1 (24 Jan 2018 14:11)  HR: 73 (25 Jan 2018 07:27) (73 - 89)  BP: 129/83 (25 Jan 2018 07:27) (119/70 - 134/84)  RR: 18 (25 Jan 2018 07:27) (18 - 18)  SpO2: 98% (25 Jan 2018 07:27) (92% - 98%)        01-24 @ 07:01  -  01-25 @ 07:00  --------------------------------------------------------  IN: 1245 mL / OUT: 0 mL / NET: 1245 mL    01-25 @ 07:01  - 01-25 @ 11:48  --------------------------------------------------------  IN: 320 mL / OUT: 0 mL / NET: 320 mL      CAPILLARY BLOOD GLUCOSE      POCT Blood Glucose.: 171 mg/dL (25 Jan 2018 08:43)      PHYSICAL EXAM:  General:    NAD, non toxic, A&O x3  HEENT:   no oropharyngeal lesions, no LAD, neck supple, no pallor  Cardio:    regular S1,S2, no murmur, dextrocardia  Respiratory:   b/l air entry equal, no wheezing, no crepts  abd:   soft, BS +, not tender, no hepatosplenomegaly  Musculoskeletal : no joint swelling, no edema  Skin:    no rash  Neurologic:     no focal deficits, sensory and motor exam grossly intact  psych: normal affect, no suicidal ideation      HOSPITAL MEDICATIONS:  MEDICATIONS  (STANDING):  enoxaparin Injectable 40 milliGRAM(s) SubCutaneous every 24 hours  insulin lispro (HumaLOG) corrective regimen sliding scale   SubCutaneous three times a day before meals  insulin lispro (HumaLOG) corrective regimen sliding scale   SubCutaneous at bedtime  levothyroxine 88 MICROGram(s) Oral daily  melatonin 5 milliGRAM(s) Oral at bedtime  predniSONE   Tablet 40 milliGRAM(s) Oral two times a day  sertraline 50 milliGRAM(s) Oral daily  trimethoprim / sulfamethoxazole IVPB      trimethoprim / sulfamethoxazole IVPB 400 milliGRAM(s) IV Intermittent every 8 hours    MEDICATIONS  (PRN):  acetaminophen   Tablet. 650 milliGRAM(s) Oral every 6 hours PRN mild or moderate pain  ondansetron    Tablet 4 milliGRAM(s) Oral every 4 hours PRN Nausea and/or Vomiting  QUEtiapine 25 milliGRAM(s) Oral every 6 hours PRN agitation, anxiety, insomnia      LABS:                        11.8   14.23 )-----------( 191      ( 25 Jan 2018 09:04 )             34.2     Hgb Trend: 11.8<--, 12.1<--, 11.3<--, 12.5<--, 13.1<--  01-25    135  |  99  |  12  ----------------------------<  93  5.3   |  23  |  0.98    Ca    9.2      25 Jan 2018 09:04      Creatinine Trend: 0.98<--, 1.05<--, 0.96<--, 0.89<--, 0.81<--, 0.87<--  PT/INR - ( 24 Jan 2018 07:35 )   PT: 11.4 sec;   INR: 1.04 ratio         PTT - ( 24 Jan 2018 07:35 )  PTT:26.6 sec    Arterial Blood Gas:  01-23 @ 17:37  7.46/30/62/21/93/-1.8    MICROBIOLOGY:     Culture - Bronchial (collected 24 Jan 2018 17:00)  Source: .Broncial Bronchoalveolar Lavage  Gram Stain (24 Jan 2018 23:03):    Few polymorphonuclear leukocytes per low power field    No squamous epithelial cells per low power field    No organisms seen    Culture - Fungal, Bronchial (collected 24 Jan 2018 17:00)  Source: .Broncial Bronchoalveolar Lavage  Preliminary Report (25 Jan 2018 10:34):    Testing in progress        RADIOLOGY:  [ ] Reviewed and interpreted by me    CT angio CHEST  No pulmonary embolism.   Scattered bilateral perihilar groundglass opacities with interlobular septal  thickening. The differential diagnosis includes mild pulmonary edema and  pneumonia.   Situs inversus.   Stenosis of the proximal celiac artery.

## 2018-01-25 NOTE — SWALLOW BEDSIDE ASSESSMENT ADULT - SWALLOW EVAL: DIAGNOSIS
Pt presents with evidence of evidence of possible oropharyngeal and pharyngoesophageal dysphagia with not overt s/s laryngeal penetration/aspiration with limited trials. Pt presents with evidence of possible oropharyngeal and/or pharyngoesophageal dysphagia with not overt s/s laryngeal penetration/aspiration with limited trials. Question possible behavioral component. However cannot fully assess at bedside. Therefore would recommend objective exam for comprehensive assessment.

## 2018-01-26 ENCOUNTER — TRANSCRIPTION ENCOUNTER (OUTPATIENT)
Age: 68
End: 2018-01-26

## 2018-01-26 DIAGNOSIS — R13.10 DYSPHAGIA, UNSPECIFIED: ICD-10-CM

## 2018-01-26 DIAGNOSIS — B59 PNEUMOCYSTOSIS: ICD-10-CM

## 2018-01-26 LAB
CULTURE RESULTS: SIGNIFICANT CHANGE UP
GALACTOMANNAN AG SERPL-ACNC: <0.5 INDEX — SIGNIFICANT CHANGE UP
GLUCOSE BLDC GLUCOMTR-MCNC: 112 MG/DL — HIGH (ref 70–99)
GLUCOSE BLDC GLUCOMTR-MCNC: 174 MG/DL — HIGH (ref 70–99)
GLUCOSE BLDC GLUCOMTR-MCNC: 90 MG/DL — SIGNIFICANT CHANGE UP (ref 70–99)
GLUCOSE BLDC GLUCOMTR-MCNC: 94 MG/DL — SIGNIFICANT CHANGE UP (ref 70–99)
H CAPSUL AG SER IA-MCNC: SIGNIFICANT CHANGE UP
SPECIMEN SOURCE: SIGNIFICANT CHANGE UP

## 2018-01-26 PROCEDURE — 99232 SBSQ HOSP IP/OBS MODERATE 35: CPT

## 2018-01-26 PROCEDURE — 74230 X-RAY XM SWLNG FUNCJ C+: CPT | Mod: 26

## 2018-01-26 PROCEDURE — 99233 SBSQ HOSP IP/OBS HIGH 50: CPT | Mod: GC

## 2018-01-26 PROCEDURE — 99233 SBSQ HOSP IP/OBS HIGH 50: CPT

## 2018-01-26 RX ORDER — SERTRALINE 25 MG/1
1 TABLET, FILM COATED ORAL
Qty: 30 | Refills: 0 | OUTPATIENT
Start: 2018-01-26 | End: 2018-02-24

## 2018-01-26 RX ORDER — LEVOTHYROXINE SODIUM 125 MCG
1 TABLET ORAL
Qty: 0 | Refills: 0 | COMMUNITY

## 2018-01-26 RX ORDER — SERTRALINE 25 MG/1
1 TABLET, FILM COATED ORAL
Qty: 0 | Refills: 0 | COMMUNITY

## 2018-01-26 RX ORDER — AZTREONAM 2 G
2 VIAL (EA) INJECTION
Qty: 96 | Refills: 0 | OUTPATIENT
Start: 2018-01-26 | End: 2018-02-10

## 2018-01-26 RX ORDER — LEVOTHYROXINE SODIUM 125 MCG
1 TABLET ORAL
Qty: 30 | Refills: 0 | OUTPATIENT
Start: 2018-01-26 | End: 2018-02-24

## 2018-01-26 RX ADMIN — Medication 40 MILLIGRAM(S): at 13:10

## 2018-01-26 RX ADMIN — Medication 525 MILLIGRAM(S): at 15:24

## 2018-01-26 RX ADMIN — Medication 525 MILLIGRAM(S): at 06:29

## 2018-01-26 RX ADMIN — Medication 1: at 18:05

## 2018-01-26 RX ADMIN — Medication 88 MICROGRAM(S): at 05:27

## 2018-01-26 RX ADMIN — Medication 25 MILLIGRAM(S): at 23:55

## 2018-01-26 RX ADMIN — SERTRALINE 50 MILLIGRAM(S): 25 TABLET, FILM COATED ORAL at 13:12

## 2018-01-26 RX ADMIN — Medication 5 MILLIGRAM(S): at 22:16

## 2018-01-26 RX ADMIN — Medication 40 MILLIGRAM(S): at 22:17

## 2018-01-26 RX ADMIN — ENOXAPARIN SODIUM 40 MILLIGRAM(S): 100 INJECTION SUBCUTANEOUS at 05:28

## 2018-01-26 RX ADMIN — ONDANSETRON 4 MILLIGRAM(S): 8 TABLET, FILM COATED ORAL at 20:49

## 2018-01-26 NOTE — PROGRESS NOTE ADULT - NSHPATTENDINGPLANDISCUSS_GEN_ALL_CORE
the patient and the primary team
the primary team
the patient and primary team
the primary team
Patient, pulmonary team.
Patient, pulmonary team
house staff

## 2018-01-26 NOTE — PROGRESS NOTE ADULT - PROBLEM SELECTOR PLAN 6
- new onset with A1C here 6.7  - could be 2/2 prednisone use, glucose on BMP well controlled, will monitor - c/w zoloft home dose  - very tearful and anxious this AM, exacerbation of emotion could be 2/2 steroids  - psych consulted, seroquel prn q6 for anxiety, insomnia

## 2018-01-26 NOTE — SWALLOW VFSS/MBS ASSESSMENT ADULT - NS SWALLOW VFSS REC ASPIR MON
Monitor for s/s aspiration/laryngeal penetration. If noted:  D/C p.o. intake, provide non-oral nutrition/hydration/meds, and contact this service @ [phone]/cari voice/pneumonia/fever/cough/throat clearing/change of breathing pattern/upper respiratory infection

## 2018-01-26 NOTE — PROGRESS NOTE ADULT - PROBLEM SELECTOR PLAN 7
DVT: lovenox - new onset with A1C here 6.7  - could be 2/2 prednisone use, glucose on BMP well controlled, will monitor

## 2018-01-26 NOTE — SWALLOW VFSS/MBS ASSESSMENT ADULT - RECOMMENDED CONSISTENCY
Pt presents with an oropharyngeal swallow that appears functional for Regular texture diet. However, for Pt's comfort she states that she prefers Pureed texture diet at this time.

## 2018-01-26 NOTE — SWALLOW VFSS/MBS ASSESSMENT ADULT - ADDITIONAL INFORMATION
+ multi-level C-spine changes with small non-obstructive anterior cervical osteophytes  + Head noted in hyperflexion during exam, suspected behavioral

## 2018-01-26 NOTE — PROGRESS NOTE ADULT - PROBLEM SELECTOR PLAN 5
- c/w zoloft home dose  - very tearful and anxious this AM, exacerbation of emotion could be 2/2 steroids  - psych consulted, seroquel prn q6 for anxiety, insomnia - no personal or family history of kidney disease, no history of diabetes  - reports darkening in color of urine, no other symptoms  - urine protein-creat ratio wnl  - f/u rheumatologic w/u as above

## 2018-01-26 NOTE — SWALLOW VFSS/MBS ASSESSMENT ADULT - DIAGNOSTIC IMPRESSIONS
Pt presents with an oropharyngeal swallow that appears intact for regular solids and thin liquids. Pt with a pharyngo-esophageal dysphagia notable for intermittent retrograde flow of entire bolus in proximal esophagus, suspected 2/2 hypergag reflex, as per d/w radiologist, Dr. Sosa. After transient retrograde flow, material then appeared to clear proximal esophagus adequately, as per radiologist. Pt with no laryngeal penetration or aspiration noted across textures.

## 2018-01-26 NOTE — SWALLOW VFSS/MBS ASSESSMENT ADULT - DEMONSTRATES NEED FOR REFERRAL TO ANOTHER SERVICE
Consider Neuro consult, GI consult to further assess underlying etiology of presentation; continue rheumatology and Psych f/u as clinically indicated

## 2018-01-26 NOTE — DISCHARGE NOTE ADULT - CARE PROVIDERS DIRECT ADDRESSES
,DirectAddress_Unknown ,DirectAddress_Unknown,DirectAddress_Unknown ,DirectAddress_Unknown,DirectAddress_Unknown,[email or web address]

## 2018-01-26 NOTE — SWALLOW VFSS/MBS ASSESSMENT ADULT - ESOPHAGEAL STAGE
+ retrograde flow of entire bolus in proximal esophagus, suspected 2/2 hypergag reflex, as per d/w radiologist  + eructation intermittent retrograde flow of entire bolus in proximal esophagus, suspected 2/2 hypergag reflex, as per d/w radiologist

## 2018-01-26 NOTE — DISCHARGE NOTE ADULT - PATIENT PORTAL LINK FT
“You can access the [facility] Patient Portal, offered by [facility], by registering with the following website: [email or web address]”

## 2018-01-26 NOTE — SWALLOW VFSS/MBS ASSESSMENT ADULT - ORAL PHASE
delayed initiation of oral transfer, suspect related to gag/anxiety intermittently prolonged oral holding, suspect related to gag, anxiety prolonged oral holding suspect due to gag, anxiety piecemeal deglutition, delayed initiation of oral transfer, suspect related to gag/anxiety

## 2018-01-26 NOTE — PROGRESS NOTE ADULT - PROBLEM SELECTOR PROBLEM 6
Type 2 diabetes mellitus without complication, without long-term current use of insulin Depression, unspecified depression type

## 2018-01-26 NOTE — DISCHARGE NOTE ADULT - PROVIDER TOKENS
TOKCLEO:'[id number]' TOKEN:'[id number]',TOKEN:'[id number]' TOKEN:'[id number]:'[id number]',TOKEN:'[id number]'

## 2018-01-26 NOTE — DISCHARGE NOTE ADULT - CARE PROVIDER_API CALL
Diego Rocha), Cardiology; Internal Medicine  [address] [place] [address]  [address]  [place] [address]  Phone: [phone]  Fax: [phone] Diego Rocha), Cardiology; Internal Medicine  [address] [place] [address]  [address]  [place] [address]  Phone: [phone]  Fax: [phone]    Daisy Givens), Internal Medicine  [address] [place] [address]  Phone: [phone]  Fax: [phone] Diego Rocha), Cardiology; Internal Medicine  [address] [facility]  [address]  [place] [address]  Phone: [phone]  Fax: [phone]    Daisy Givens), Internal Medicine  [address] [place] [address]  Phone: [phone]  Fax: [phone]    Damaris Anders), Internal Medicine; Rheumatology  [address] [place] [address]  Phone: [phone]  Fax: [phone]

## 2018-01-26 NOTE — SWALLOW VFSS/MBS ASSESSMENT ADULT - ORAL PHASE COMMENTS
Pt unable to initiate transfer of bolus due to gag; Pt noted to expectorate bolus; unable to swallow bolus of this texture despite multiple attempts - Pt reported due to taste.

## 2018-01-26 NOTE — PROGRESS NOTE ADULT - PROBLEM SELECTOR PLAN 2
- diagnosed in November after complaining of diffuse muscle pains, pt states rheumatologist diagnosed with a blood test but unsure of what it was  - started on steroids at that time, with resolution of muscle pains only after stopping crestor 2 weeks later  - dx questionable, continuing with higher dose steroids here for concern of PCP with hypoxia Afebrile, Has been on IV Bactrim and prednisone per ID/Pulmonary recs  - Fungitell >500, Galactomannan and histoplasma Ag neg.    CT chest showed b/l ground glass opacity  - BAL post bronch biopsy -ve for fungus and PCP

## 2018-01-26 NOTE — PROGRESS NOTE ADULT - SUBJECTIVE AND OBJECTIVE BOX
Subjective: Pt still does not feel like her body is back to nl.  No CP or SOB and O2 satn is nl  No evidence for PCP- fungal cx pending    MEDICATIONS  (STANDING):  enoxaparin Injectable 40 milliGRAM(s) SubCutaneous every 24 hours  insulin lispro (HumaLOG) corrective regimen sliding scale   SubCutaneous three times a day before meals  insulin lispro (HumaLOG) corrective regimen sliding scale   SubCutaneous at bedtime  levothyroxine 88 MICROGram(s) Oral daily  melatonin 5 milliGRAM(s) Oral at bedtime  predniSONE   Tablet 40 milliGRAM(s) Oral two times a day  sertraline 50 milliGRAM(s) Oral daily  trimethoprim / sulfamethoxazole IVPB      trimethoprim / sulfamethoxazole IVPB 400 milliGRAM(s) IV Intermittent every 8 hours    MEDICATIONS  (PRN):  acetaminophen   Tablet. 650 milliGRAM(s) Oral every 6 hours PRN mild or moderate pain  ondansetron    Tablet 4 milliGRAM(s) Oral every 4 hours PRN Nausea and/or Vomiting  QUEtiapine 25 milliGRAM(s) Oral every 6 hours PRN agitation, anxiety, insomnia      Vital Signs Last 24 Hrs  T(C): 36.6 (26 Jan 2018 08:03), Max: 36.6 (25 Jan 2018 20:10)  T(F): 97.8 (26 Jan 2018 08:03), Max: 97.9 (25 Jan 2018 20:10)  HR: 90 (26 Jan 2018 08:03) (90 - 95)  BP: 165/82 (26 Jan 2018 08:03) (124/78 - 165/82)  BP(mean): --  RR: 18 (26 Jan 2018 08:03) (18 - 20)  SpO2: 98% (26 Jan 2018 08:03) (98% - 98%)    PHYSICAL EXAM:    Constitutional: WD, WN  Neck: no JVD  Respiratory: clear lungs  Cardiovascular: RRR, dextrocardia, no murmurs  Extremities: no edema noted  Neurological: grossly nonfocal  Psychiatric: depressed, nl affect    TELEMETRY:    ECG:      LABS:                        11.8   14.23 )-----------( 191      ( 25 Jan 2018 09:04 )             34.2     01-25    135  |  99  |  12  ----------------------------<  93  5.3   |  23  |  0.98    Ca    9.2      25 Jan 2018 09:04              I&O's Summary    25 Jan 2018 07:01  -  26 Jan 2018 07:00  --------------------------------------------------------  IN: 1325 mL / OUT: 0 mL / NET: 1325 mL      BNP  RADIOLOGY & ADDITIONAL STUDIES:    IMPRESSION:  SOB with hypoxemia- ? infectious vs inflammatory process- not cardiac in origen  Hypothyroidism  HLD  PMR    RECOMMENDATIONS:  Continue current meds  F/u final bronchial cultures and stains  Pulm and Rheum f/u  f/u CBC and BMP

## 2018-01-26 NOTE — PROGRESS NOTE ADULT - PROBLEM SELECTOR PLAN 4
- no personal or family history of kidney disease, no history of diabetes  - reports darkening in color of urine, no other symptoms  - f/u rheumatologic w/u as above - no personal or family history of kidney disease, no history of diabetes  - reports darkening in color of urine, no other symptoms  - urine protein-creat ratio wnl  - f/u rheumatologic w/u as above - elevated TSH with normal T4 suggesting subclinical  - will c/w home dose and monitor for symptoms

## 2018-01-26 NOTE — DISCHARGE NOTE ADULT - MEDICATION SUMMARY - MEDICATIONS TO STOP TAKING
I will STOP taking the medications listed below when I get home from the hospital:    predniSONE 1 mg oral tablet  -- 3 tab(s) by mouth once a day

## 2018-01-26 NOTE — DISCHARGE NOTE ADULT - OTHER SIGNIFICANT FINDINGS
< from: CT Angio Chest w/ IV Cont (01.20.18 @ 08:50) >  IMPRESSION:   No pulmonary embolism.    Scattered bilateral perihilar groundglass opacities with interlobular   septal thickening. The differential diagnosis includes  mild pulmonary   edema and pneumonia.    Situs inversus.    Stenosis of the proximal celiac artery.      < from: MR Head w/wo IV Cont (01.23.18 @ 13:52) >  IMPRESSION: Minimal small vessel white matter ischemic changes. No acute   infarcts, hemorrhage or mass. No abnormal enhancement.

## 2018-01-26 NOTE — PROGRESS NOTE ADULT - ASSESSMENT
67F with situs inversus,  hyperlipidemia, hypothyroidism, was started on Crestor recently and developed muscle pain 11/2017and was seen by rheum, was considered PMR and giant cell arthritis in view of scalp tenderness, so was started on prednisone 60 and underwent temporal artery biopsy which was negative. The rheumatologist started to taper the prednisone but patient believes when she discontinued the Crestor her muscle pain resolved. 2 weeks into steroids she started to develop dyspnea with palpitation and fatigue. She denied fever, chills, cough, weight loss but she had altered mental status with a change in her a smelling, bleeding from the nose and night sweats.  She was born in [place] no recent travel, last travel was 2000 to [place], lives with her mother, has cats and used to had birds ( in parrots family) before.  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  She has strong family history of auto immune disorders like RA, mixed connective tissue disorder or Rheynaud    dyspnea with hypoxia, b/l groundglass opacities, epistaxis, ? AMS with impairment in taste and smell, ? recent DX of PML, giant cell arthritis with negative temporal artery BX on tapering steroids  strong family history of auto immune disorders, used to have birds, from [place]  negative ANCA, but atypical ANCA: indeterminate, OLEKSANDR: 1: 160 nuclear,   OLEKSANDR Pattern: Nuclear Dots  ESR: 44, CRP: 8.60  urine histo Ag: negative, fungitail>500, BAL AFB negative, gram stain negative, fungal in progress but the PCP PCR was not done  pathology with chronic inflammation but negative for vasculitis or fungal stain  auto immune/Wegener's   vs infectious like PCP, fungal ( slightly improved on bactrim+steroids but path fungal stain is negative and fungitail > 500)  imagings were discussed with radiology which stated that PCP is more likely in the picture    c/w bactrim 15 mg/kg divided to q8  f/u  BAL studies if there is any specimen left, please send it  for PCP PCR  f/u the fungal BAL CX  f/u galactomannan  if patient is ready for discharge will switch to PO bactrim 2 DS q8 for total of 21 days

## 2018-01-26 NOTE — DISCHARGE NOTE ADULT - HOSPITAL COURSE
67F with situs inversus, ?PMR on steroids, hyperlipidemia, hypothyroidism presented with 1 month of progressively worsening shortness of breath, palpitations, weakness and fatigue. However, since starting the steroids she has had increasingly worsening SOB where she becomes tachypneic and feels she cannot get a good breath in. This is associated with palpitations. Denies lightheadedness, dizziness or vision changes. She reports a 13-15 pound weight gain in the past 1-2 months. She reported weakness to the point of not being able to get out full sentences and has not had much physical activity. She also reported a HA that moved throughout her head along with change in taste and a "generalized unwell" feeling. FH significant for mixed connective tissue disease and RA in her sister.     In the ED, vitals were 142/86, 97.2F, HR 91, 18-93%RA. CTA showed b/l groundglass opacities and UA significant for proteinuria. She was given ceftriaxone/azithro. RVP negative. Patient was hypoxic on ambulation to 88%. ID, Rheum and pulmonology were consulted as there was concern for ILD vs. PCP vs. vasculitis. 67F with situs inversus, ?PMR on steroids, hyperlipidemia, hypothyroidism presented with 1 month of progressively worsening shortness of breath, palpitations, weakness and fatigue. However, since starting the steroids she has had increasingly worsening SOB where she becomes tachypneic and feels she cannot get a good breath in. This is associated with palpitations. Denies lightheadedness, dizziness or vision changes. She reports a 13-15 pound weight gain in the past 1-2 months. She reported weakness to the point of not being able to get out full sentences and has not had much physical activity. She also reported a HA that moved throughout her head along with change in taste and a "generalized unwell" feeling. FH significant for mixed connective tissue disease and RA in her sister.     In the ED, vitals were 142/86, 97.2F, HR 91, 18-93%RA. CTA showed b/l groundglass opacities and UA significant for proteinuria. She was given ceftriaxone/azithro. RVP negative. Patient was hypoxic on ambulation to 88%. ID, Rheum and pulmonology were consulted as there was concern for ILD vs. PCP vs. vasculitis. Patient had a bronchoscopy on 1/24 which was negative for malignant cells and gram stain negative. Did shows WBCs pred lymphocytes. Fungitell was >500 so concern for PCP was still high. Patient was started on prednisone and bactrim for PCP. Rheum w/u included: neg ANCA, neg RF, C3 and C4 wnl, OLEKSANDR 1:160, antiGB negative. SOB gradually started to improve.     For dysphagia, patient had MBS which showed some retrograde flow indicative of hypergag/anxiety. She was started on a pureed diet at request of patient. 67F with situs inversus, ?PMR on steroids, hyperlipidemia, hypothyroidism presented with 1 month of progressively worsening shortness of breath, palpitations, weakness and fatigue. However, since starting the steroids she has had increasingly worsening SOB where she becomes tachypneic and feels she cannot get a good breath in. This is associated with palpitations. Denies lightheadedness, dizziness or vision changes. She reports a 13-15 pound weight gain in the past 1-2 months. She reported weakness to the point of not being able to get out full sentences and has not had much physical activity. She also reported a HA that moved throughout her head along with change in taste and a "generalized unwell" feeling. FH significant for mixed connective tissue disease and RA in her sister.     In the ED, vitals were 142/86, 97.2F, HR 91, 18-93%RA. CTA showed b/l groundglass opacities and UA significant for proteinuria. She was given ceftriaxone/azithro. RVP negative. Patient was hypoxic on ambulation to 88%. ID, Rheum and pulmonology were consulted as there was concern for ILD vs. PCP vs. vasculitis. MRI brain was done due to AMS and change in taste per patient (however always AOx3) and was negative. Patient had a bronchoscopy on 1/24 which was negative for malignant cells and gram stain negative. Did shows WBCs pred lymphocytes. Fungitell was >500 so concern for PCP was still high. Patient was started on prednisone and bactrim for PCP. Rheum w/u included: neg ANCA, neg RF, C3 and C4 wnl, OLEKSANDR 1:160, antiGB negative. SOB gradually started to improve. She was saturating mid-high 90s on RA and with ambulation.     For dysphagia, patient had MBS which showed some retrograde flow indicative of hypergag/anxiety. Dysphagia likely somatic in nature. Psychiatry was consulted during admission for clear signs of depression and anxiety as patient would become tearful every morning with many nonspecific somatic complaints. She was continued on home dose zoloft and seroquel added prn for anxiety. She was started on a pureed diet at request of patient. PCP PCR was still pending however because of the positive fungitell and CT findings PCP was very likely. Patient was d/yuki with full PCP treatment course and steroid taper. She was instructed to follow up with rheumatology in order to determine further steroid need as her diagnosis of PMR is questionable. Patient was hemodynamically stable at discharge. 67F with situs inversus, ?PMR on steroids, hyperlipidemia, hypothyroidism presented with 1 month of progressively worsening shortness of breath, palpitations, weakness and fatigue. However, since starting the steroids she has had increasingly worsening SOB where she becomes tachypneic and feels she cannot get a good breath in. This is associated with palpitations. Denies lightheadedness, dizziness or vision changes. She reports a 13-15 pound weight gain in the past 1-2 months. She reported weakness to the point of not being able to get out full sentences and has not had much physical activity. She also reported a HA that moved throughout her head along with change in taste and a "generalized unwell" feeling. FH significant for mixed connective tissue disease and RA in her sister.     In the ED, vitals were 142/86, 97.2F, HR 91, 18-93%RA. CTA showed b/l groundglass opacities and UA significant for proteinuria. She was given ceftriaxone/azithro. RVP negative. Patient was hypoxic on ambulation to 88%. ID, Rheum and pulmonology were consulted as there was concern for ILD vs. PCP vs. vasculitis. MRI brain was done due to AMS and change in taste per patient (however always AOx3) and was negative. Patient had a bronchoscopy on 1/24 which was negative for malignant cells and gram stain negative. Did shows WBCs pred lymphocytes. Fungitell was >500 so concern for PCP was still high. Patient was started on prednisone and bactrim for PCP. Rheum w/u included: neg ANCA, neg RF, C3 and C4 wnl, OLEKSANDR 1:160, antiGB negative. SOB gradually started to improve. She was saturating mid-high 90s on RA and with ambulation.     For dysphagia, patient had MBS which showed some retrograde flow indicative of hypergag/anxiety. Dysphagia likely somatic in nature. Psychiatry was consulted during admission for clear signs of depression and anxiety as patient would become tearful every morning with many nonspecific somatic complaints. She was continued on home dose zoloft and seroquel added prn for anxiety. She was started on a pureed diet at request of patient. PCP PCR was still pending however because of the positive fungitell and CT findings PCP was very likely. Patient was discharged with full PCP treatment course and steroid taper. She was instructed to follow up with rheumatology in order to determine further steroid need as her diagnosis of PMR is questionable. Patient was hemodynamically stable at discharge.   Discharge time- 47  min

## 2018-01-26 NOTE — DISCHARGE NOTE ADULT - CARE PLAN
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 You were found to have pneumonia on imaging of your lungs that is likely due to a fungus called pneumocystis. You were started on treatment for this while admitted including prednisone and an antibiotic called bactrim. Please continue to take these medications as prescribed and follow up with your primary care doctor within 1 month of discharge to ensure improvement and proper treatment.  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 You were found to have pneumonia on imaging of your lungs that is likely due to a fungus called pneumocystis. You were started on treatment for this while admitted including prednisone and an antibiotic called bactrim. Please continue to take these medications as prescribed and follow up with your primary care doctor within 1 month of discharge to ensure improvement and proper treatment.  Secondary Diagnosis:	Depression, unspecified depression type  Assessment and plan of treatment:	Please continue to take your zoloft as prescribed and follow up with your primary care doctor for further treatment. Please consider follow up with an outpatient psychiatry to help with anxiety and depression.  Secondary Diagnosis:	Hypothyroidism, unspecified type  Assessment and plan of treatment:	Please continue to take your synthroid as prescribed and follow up with your primary care doctor for further monitoring. Your TSH was slightly elevated while admitted however your T4 thyroid hormone was normal. This can happen in acute illness. Please have your primary care doctor repeat these tests once your pneumonia has resolved. 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 You were found to have pneumonia on imaging of your lungs that is likely due to a fungus called pneumocystis. You were started on treatment for this while admitted including prednisone and an antibiotic called bactrim. Please continue to take these medications as prescribed and follow up with your primary care doctor within 1 month of discharge to ensure improvement and proper treatment.  Secondary Diagnosis:	Depression, unspecified depression type  Assessment and plan of treatment:	Please continue to take your zoloft as prescribed and follow up with your primary care doctor for further treatment. Please consider follow up with an outpatient psychiatry to help with anxiety and depression.  Secondary Diagnosis:	Hypothyroidism, unspecified type  Assessment and plan of treatment:	Please continue to take your synthroid as prescribed and follow up with your primary care doctor for further monitoring. Your TSH was slightly elevated while admitted however your T4 thyroid hormone was normal. This can happen in acute illness. Please have your primary care doctor repeat these tests once your pneumonia has resolved.  Secondary Diagnosis:	Polymyalgia rheumatica  Assessment and plan of treatment:	You were previously diagnosed by your rheumatologist with polymyalgia rheumatica. Because your muscle cramps stopped with stopping crestor this may not be accurate. Please follow up with your rheumatologist to determine further need for steroids. 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 You were found to have pneumonia on imaging of your lungs that is likely due to a fungus called pneumocystis. You were started on treatment for this while admitted including prednisone and an antibiotic called bactrim. Please continue to take these medications as prescribed and follow up with your primary care doctor within 1 month of discharge to ensure improvement and proper treatment.  Secondary Diagnosis:	Depression, unspecified depression type  Assessment and plan of treatment:	Please continue to take your zoloft as prescribed and follow up with your primary care doctor for further treatment. Please consider establishing care with a psychiatrist if you feel overwhelming feelings of anxiety or depression.  Secondary Diagnosis:	Hypothyroidism, unspecified type  Assessment and plan of treatment:	Please continue to take your synthroid as prescribed and follow up with your primary care doctor for further monitoring. Your TSH was slightly elevated while admitted however your T4 thyroid hormone was normal. This can happen in acute illness. Please have your primary care doctor repeat these tests once your pneumonia has resolved.  Secondary Diagnosis:	Polymyalgia rheumatica  Assessment and plan of treatment:	You were previously diagnosed by your rheumatologist with polymyalgia rheumatica. Because your muscle cramps stopped with stopping crestor this may not be accurate. Please follow up with your rheumatologist to determine further need for steroids. 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 You were found to have pneumonia on imaging of your lungs that is likely due to a fungus called pneumocystis. You were started on treatment for this while admitted including prednisone and an antibiotic called bactrim. Please continue to take these medications as prescribed and follow up with your doctors within 1 month of discharge to ensure improvement and proper treatment. You will need to follow up with your rheumatologist to determine your need for further steroid treatment. If you require chronic steroid treatment for a rheumatologic condition you will need pneumocystis pneumonia prophylaxis treatment as well.  Secondary Diagnosis:	Depression, unspecified depression type  Assessment and plan of treatment:	Please continue to take your zoloft as prescribed and follow up with your primary care doctor for further treatment. Please consider establishing care with a psychiatrist if you feel overwhelming feelings of anxiety or depression.  Secondary Diagnosis:	Hypothyroidism, unspecified type  Assessment and plan of treatment:	Please continue to take your synthroid as prescribed and follow up with your primary care doctor for further monitoring. Your TSH was slightly elevated while admitted however your T4 thyroid hormone was normal. This can happen in acute illness. Please have your primary care doctor repeat these tests once your pneumonia has resolved.  Secondary Diagnosis:	Polymyalgia rheumatica  Assessment and plan of treatment:	You were previously diagnosed by your rheumatologist with polymyalgia rheumatica. Because your muscle cramps stopped with stopping crestor this may not be accurate. Please follow up with your rheumatologist to determine further need for steroids. 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 You were found to have pneumonia on imaging of your lungs that is likely due to a fungus called pneumocystis. You were started on treatment for this while admitted including prednisone and an antibiotic called bactrim. Please continue to take these medications as prescribed and follow up with your doctors within 1 month of discharge to ensure improvement and proper treatment. You will need to follow up with your rheumatologist to determine your need for further steroid treatment. If you require chronic steroid treatment for a rheumatologic condition you will need pneumocystis pneumonia prophylaxis treatment as well.    You may experience physical weakness given the pneumonia. If your weakness continues to worsen however, it is pertinent to have good follow up with your doctor.  Secondary Diagnosis:	Depression, unspecified depression type  Assessment and plan of treatment:	Please continue to take your zoloft as prescribed and follow up with your primary care doctor for further treatment. Please consider establishing care with a psychiatrist if you feel overwhelming feelings of anxiety or depression.  Secondary Diagnosis:	Hypothyroidism, unspecified type  Assessment and plan of treatment:	Please continue to take your synthroid as prescribed and follow up with your primary care doctor for further monitoring. Your TSH was slightly elevated while admitted however your T4 thyroid hormone was normal. This can happen in acute illness. Please have your primary care doctor repeat these tests once your pneumonia has resolved.  Secondary Diagnosis:	Polymyalgia rheumatica  Assessment and plan of treatment:	You were previously diagnosed by your rheumatologist with polymyalgia rheumatica. Because your muscle cramps stopped with stopping crestor this may not be accurate. Please follow up with your rheumatologist to determine further need for steroids.

## 2018-01-26 NOTE — SWALLOW VFSS/MBS ASSESSMENT ADULT - SLP GENERAL OBSERVATIONS
Pt received in radiology, secure in RAMONITA chair. Pt awake and alert, cooperative, following directions for the purposes of the exam.

## 2018-01-26 NOTE — PROGRESS NOTE ADULT - SUBJECTIVE AND OBJECTIVE BOX
CHIEF COMPLAINT:  Shortness of breath    Interval Events:  patient seen and examined this morning  feeling somewhat better, able to focus  says that she is unsteady on her feet  RA sats 96%, no desaturation on ambulation    REVIEW OF SYSTEMS:  Constitutional: [ ] negative [ ] fevers [ ] chills [x] weight loss [ ] weight gain  HEENT: [x] negative [ ] dry eyes [ ] eye irritation [ ] postnasal drip [ ] nasal congestion  CV: [ ] negative  [ x] chest pain [ x] orthopnea [x] palpitations [ ] murmur  Resp: [ ] negative [ ] cough [x ] shortness of breath [x ] dyspnea [ ] wheezing [ ] sputum [ ] hemoptysis  GI: [x ] negative [ ] nausea [ ] vomiting [ ] diarrhea [ ] constipation [ ] abd pain [ ] dysphagia   : [x ] negative [ ] dysuria [ ] nocturia [ ] hematuria [ ] increased urinary frequency  Musculoskeletal: [ ] negative [ ] back pain [x ] myalgias [ ] arthralgias [ ] fracture  Skin: [ x] negative [ ] rash [ ] itch  Neurological: [x ] negative [ ] headache [ ] dizziness [ ] syncope [ ] weakness [ ] numbness  Psychiatric: [ ] negative [ ] anxiety [ x] depression  Endocrine: [x ] negative [ ] diabetes [ ] thyroid problem  Hematologic/Lymphatic: [x ] negative [ ] anemia [ ] bleeding problem  Allergic/Immunologic: [x ] negative [ ] itchy eyes [ ] nasal discharge [ ] hives [ ] angioedema    OBJECTIVE:  ICU Vital Signs Last 24 Hrs  T(C): 36.6 (26 Jan 2018 08:03), Max: 36.6 (25 Jan 2018 20:10)  T(F): 97.8 (26 Jan 2018 08:03), Max: 97.9 (25 Jan 2018 20:10)  HR: 90 (26 Jan 2018 08:03) (90 - 95)  BP: 165/82 (26 Jan 2018 08:03) (124/78 - 165/82)  RR: 18 (26 Jan 2018 08:03) (18 - 20)  SpO2: 98% (26 Jan 2018 08:03) (98% - 98%)        01-25 @ 07:01  -  01-26 @ 07:00  --------------------------------------------------------  IN: 1325 mL / OUT: 0 mL / NET: 1325 mL    01-26 @ 07:01  - 01-26 @ 16:35  --------------------------------------------------------  IN: 1180 mL / OUT: 0 mL / NET: 1180 mL      CAPILLARY BLOOD GLUCOSE      POCT Blood Glucose.: 90 mg/dL (26 Jan 2018 12:52)      PHYSICAL EXAM:  General:    NAD, non toxic, A&O x3  HEENT:   no oropharyngeal lesions, no LAD, neck supple, no pallor  Cardio:    regular S1,S2, no murmur, dextrocardia  Respiratory:   b/l air entry equal, no wheezing, no crepts  abd:   soft, BS +, not tender, no hepatosplenomegaly  Musculoskeletal : no joint swelling, no edema  Skin:    no rash  Neurologic:     no focal deficits, sensory and motor exam grossly intact  Psych: normal affect, no suicidal ideation    HOSPITAL MEDICATIONS:  MEDICATIONS  (STANDING):  enoxaparin Injectable 40 milliGRAM(s) SubCutaneous every 24 hours  insulin lispro (HumaLOG) corrective regimen sliding scale   SubCutaneous three times a day before meals  insulin lispro (HumaLOG) corrective regimen sliding scale   SubCutaneous at bedtime  levothyroxine 88 MICROGram(s) Oral daily  melatonin 5 milliGRAM(s) Oral at bedtime  predniSONE   Tablet 40 milliGRAM(s) Oral two times a day  sertraline 50 milliGRAM(s) Oral daily  trimethoprim / sulfamethoxazole IVPB 400 milliGRAM(s) IV Intermittent once    MEDICATIONS  (PRN):  acetaminophen   Tablet. 650 milliGRAM(s) Oral every 6 hours PRN mild or moderate pain  ondansetron    Tablet 4 milliGRAM(s) Oral every 4 hours PRN Nausea and/or Vomiting  QUEtiapine 25 milliGRAM(s) Oral every 6 hours PRN agitation, anxiety, insomnia      LABS:                        11.8   14.23 )-----------( 191      ( 25 Jan 2018 09:04 )             34.2     Hgb Trend: 11.8<--, 12.1<--, 11.3<--, 12.5<--, 13.1<--  01-25    135  |  99  |  12  ----------------------------<  93  5.3   |  23  |  0.98    Ca    9.2      25 Jan 2018 09:04      Creatinine Trend: 0.98<--, 1.05<--, 0.96<--, 0.89<--, 0.81<--, 0.87<--            MICROBIOLOGY:     Culture - Bronchial (collected 24 Jan 2018 17:00)  Source: .Broncial Bronchoalveolar Lavage  Gram Stain (24 Jan 2018 23:03):    Few polymorphonuclear leukocytes per low power field    No squamous epithelial cells per low power field    No organisms seen  Preliminary Report (25 Jan 2018 18:35):    No growth to date    Culture - Fungal, Bronchial (collected 24 Jan 2018 17:00)  Source: .Broncial Bronchoalveolar Lavage  Preliminary Report (25 Jan 2018 10:34):    Testing in progress    Culture - Acid Fast - Bronchial w/Smear (collected 24 Jan 2018 17:00)  Source: .Broncial Bronchoalveolar Lavage          CT angio CHEST  No pulmonary embolism.   Scattered bilateral perihilar groundglass opacities with interlobular septal  thickening. The differential diagnosis includes mild pulmonary edema and  pneumonia.   Situs inversus.   Stenosis of the proximal celiac artery.       TRANS BRONCHIAL BIOPSY  1. Lung, left upper lobe, transbronchial biopsy  - Bronchial wall with mild chronic inflammation  - Pulmonary parenchyma with reactive pneumocyte hyperplasia  - Negative for granuloma or vasculitis  - GMS stain is negative for fungal/PCP organisms    2. Left bronchus intermedius, endobronchial biopsy  - Bronchial wall with chronic inflammation  - GMS stain is negative for fungal/PCP organisms

## 2018-01-26 NOTE — DISCHARGE NOTE ADULT - MEDICATION SUMMARY - MEDICATIONS TO TAKE
I will START or STAY ON the medications listed below when I get home from the hospital:    Deltasone 20 mg oral tablet  -- 2 tab(s) by mouth once a day for 5 days and then 1 tab by mouth daily  -- Indication: For Pneumocystis carinii pneumonia    Zoloft 50 mg oral tablet  -- 1 tab(s) by mouth once a day  -- Indication: For Depression    Bactrim  mg-160 mg oral tablet  -- 2 tab(s) by mouth every 8 hours  -- Indication: For Pneumocystis carinii pneumonia    Synthroid 88 mcg (0.088 mg) oral tablet  -- 1 tab(s) by mouth once a day  -- Indication: For Hypothyroid

## 2018-01-26 NOTE — DISCHARGE NOTE ADULT - PLAN OF CARE
follow up with primary care doctor, pulmonology and infectious disease within 1 month of discharge You were admitted due to shortness of breath and generalized unwell feeling. You were found to have pneumonia on imaging of your lungs that is likely due to a fungus called pneumocystis. You were started on treatment for this while admitted including prednisone and an antibiotic called bactrim. Please continue to take these medications as prescribed and follow up with your primary care doctor within 1 month of discharge to ensure improvement and proper treatment. Please continue to take your zoloft as prescribed and follow up with your primary care doctor for further treatment. Please consider follow up with an outpatient psychiatry to help with anxiety and depression. Please continue to take your synthroid as prescribed and follow up with your primary care doctor for further monitoring. Your TSH was slightly elevated while admitted however your T4 thyroid hormone was normal. This can happen in acute illness. Please have your primary care doctor repeat these tests once your pneumonia has resolved. You were previously diagnosed by your rheumatologist with polymyalgia rheumatica. Because your muscle cramps stopped with stopping crestor this may not be accurate. Please follow up with your rheumatologist to determine further need for steroids. Please continue to take your zoloft as prescribed and follow up with your primary care doctor for further treatment. Please consider establishing care with a psychiatrist if you feel overwhelming feelings of anxiety or depression. You were admitted due to shortness of breath and generalized unwell feeling. You were found to have pneumonia on imaging of your lungs that is likely due to a fungus called pneumocystis. You were started on treatment for this while admitted including prednisone and an antibiotic called bactrim. Please continue to take these medications as prescribed and follow up with your doctors within 1 month of discharge to ensure improvement and proper treatment. You will need to follow up with your rheumatologist to determine your need for further steroid treatment. If you require chronic steroid treatment for a rheumatologic condition you will need pneumocystis pneumonia prophylaxis treatment as well. You were admitted due to shortness of breath and generalized unwell feeling. You were found to have pneumonia on imaging of your lungs that is likely due to a fungus called pneumocystis. You were started on treatment for this while admitted including prednisone and an antibiotic called bactrim. Please continue to take these medications as prescribed and follow up with your doctors within 1 month of discharge to ensure improvement and proper treatment. You will need to follow up with your rheumatologist to determine your need for further steroid treatment. If you require chronic steroid treatment for a rheumatologic condition you will need pneumocystis pneumonia prophylaxis treatment as well.    You may experience physical weakness given the pneumonia. If your weakness continues to worsen however, it is pertinent to have good follow up with your doctor.

## 2018-01-26 NOTE — PROGRESS NOTE ADULT - PROBLEM SELECTOR PLAN 1
Hypoxemic at 88% off O2 while ambulating per RN today.  - progressively worsening SOB with palpitations, weakness and fatigue for 1 month, differential includes infectious(?PCP) vs rheumatologic (vasculitis, connective tissue disease) vs. interstitial lung disease  - saturating 93-97% on RA at rest, not tachypneic or tachycardic at this time  - EKG showed sinus tachycardia on admission  - CTA as above, b/l ground glass opacities, negative for PE  - no evidence of fluid overload, ischemic changes on EKG, troponins neg x2, unlikely cardiac  - afebrile, no leukocytosis, however given CT findings and long term steroids, concern for PCP, started on bactrim and high dose steroids for hypoxia  - antiGBM negative, ANCA negative, RF negative, C3 and C4 wnl  - OLEKSANDR 1:160  - fungitell >500  - pulm consulted - bronch done yesterday, f/u results  - ID following - MR brain negative Hypoxemic at 88% off O2 while ambulating per RN today.  - progressively worsening SOB with palpitations, weakness and fatigue for 1 month, differential includes infectious(?PCP) vs rheumatologic (vasculitis, connective tissue disease) vs. interstitial lung disease  - saturating 93-97% on RA at rest, not tachypneic or tachycardic at this time  - EKG showed sinus tachycardia on admission  - CTA as above, b/l ground glass opacities, negative for PE  - no evidence of fluid overload, ischemic changes on EKG, troponins neg x2, ECHO wnl  - afebrile, no leukocytosis on admission, however given CT findings and long term steroids, concern for PCP, started on bactrim and high dose steroids for hypoxia - now with leukocytosis  - antiGBM negative, ANCA negative, RF negative, C3 and C4 wnl  - OLEKSANDR 1:160  - fungitell >500  - pulm consulted - bronch done 1/24, f/u results  - ID following - MR brain negative

## 2018-01-26 NOTE — PROGRESS NOTE ADULT - SUBJECTIVE AND OBJECTIVE BOX
Follow Up:  pneumonia    Interval History: pt stable, afebrile, going for bronch and biopsy today    ROS:      All other systems negative    Constitutional: no fever, no chills  HEENT:  no vision changes, no sore throat, no rhinorrhea  Cardiovascular:  no chest pain, no palpitation  Respiratory:  improved SOB, no cough  GI:  no abd pain, no vomiting, no diarrhea  urinary: no dysuria, no hematuria, no flank pain  musculoskeletal:  no joint pain, no joint swelling  skin:  no rash  neurology:  no headache, no seizure        Allergies  Vitamin E (Hives)        ANTIMICROBIALS:  trimethoprim / sulfamethoxazole IVPB    trimethoprim / sulfamethoxazole IVPB 400 every 8 hours      OTHER MEDS:  acetaminophen   Tablet. 650 milliGRAM(s) Oral every 6 hours PRN  enoxaparin Injectable 40 milliGRAM(s) SubCutaneous every 24 hours  insulin lispro (HumaLOG) corrective regimen sliding scale   SubCutaneous three times a day before meals  insulin lispro (HumaLOG) corrective regimen sliding scale   SubCutaneous at bedtime  levothyroxine 88 MICROGram(s) Oral daily  melatonin 5 milliGRAM(s) Oral at bedtime  ondansetron    Tablet 4 milliGRAM(s) Oral every 4 hours PRN  predniSONE   Tablet 40 milliGRAM(s) Oral two times a day  QUEtiapine 25 milliGRAM(s) Oral every 6 hours PRN  sertraline 50 milliGRAM(s) Oral daily      Vital Signs Last 24 Hrs  T(C): 36.8 (24 Jan 2018 07:10), Max: 37.1 (23 Jan 2018 16:12)  T(F): 98.3 (24 Jan 2018 07:10), Max: 98.7 (23 Jan 2018 16:12)  HR: 87 (24 Jan 2018 07:10) (83 - 92)  BP: 151/83 (24 Jan 2018 07:10) (122/80 - 151/83)  BP(mean): --  RR: 18 (24 Jan 2018 07:10) (18 - 20)  SpO2: 94% (24 Jan 2018 07:10) (92% - 94%)    Physical Exam:  General:    NAD,  non toxic  HEENT:    no oropharyngeal lesions,   no LAD,   neck supple  Cardio:     regular S1, S2,  no murmur  Respiratory:    clear b/l,    no wheezing  abd:     soft,   BS +,   no tenderness,    no organomegaly  :   no CVAT,  no suprapubic tenderness,   no  bowser  Musculoskeletal:   no joint swelling,   no edema  vascular: no lines, normal pulses  Skin:    no rash  Neurologic:     no focal deficit  psych: normal affect, no suicidal ideation                          12.1   13.0  )-----------( 270      ( 24 Jan 2018 07:35 )             33.0       01-24    135  |  99  |  11  ----------------------------<  192<H>  3.9   |  20<L>  |  1.05    Ca    9.1      24 Jan 2018 07:35            MICROBIOLOGY:    BAL AFB: negative  BAL cx : negative  BAL fungal: in progress    Rapid RVP Result: NotDetec (01-20 @ 14:40)    PATHOLOGY:    Surgical Pathology   1. Lung, left upper lobe, transbronchial biopsy  - Bronchial wall with mild chronic inflammation  - Pulmonary parenchymawith reactive pneumocyte hyperplasia  - Negative for granuloma or vasculitis  - GMS stain is negative for fungal/PCP organisms    2. Left bronchus intermedius, endobronchial biopsy  - Bronchial wall with chronic inflammation  - GMS stain is negative for fungal/PCP organisms      RADIOLOGY:    < from: CT Angio Chest w/ IV Cont (01.20.18 @ 08:50) >    IMPRESSION:   No pulmonary embolism.    Scattered bilateral perihilar groundglass opacities with interlobular   septal thickening. The differential diagnosis includes  mild pulmonary   edema and pneumonia.    Situs inversus.    Stenosis of the proximal celiac artery.      < from: MR Head w/wo IV Cont (01.23.18 @ 13:52) >    IMPRESSION: Minimal small vessel white matter ischemic changes. No acute   infarcts, hemorrhage or mass. No abnormal enhancement.            IMPRESSION: Minimal small vessel white matter ischemic changes. No acute   infarcts, hemorrhage or mass. No abnormal enhancement. Follow Up:  pneumonia    Interval History: pt stable, afebrile, going for bronch and biopsy today    ROS:      All other systems negative    Constitutional: no fever, no chills  HEENT:  no vision changes, no sore throat, no rhinorrhea  Cardiovascular:  no chest pain, no palpitation  Respiratory:  improved SOB, no cough  GI:  no abd pain, no vomiting, no diarrhea  urinary: no dysuria, no hematuria, no flank pain  musculoskeletal:  no joint pain, no joint swelling  skin:  no rash  neurology:  no headache, no seizure        Allergies  Vitamin E (Hives)        ANTIMICROBIALS:  trimethoprim / sulfamethoxazole IVPB    trimethoprim / sulfamethoxazole IVPB 400 every 8 hours      OTHER MEDS:  acetaminophen   Tablet. 650 milliGRAM(s) Oral every 6 hours PRN  enoxaparin Injectable 40 milliGRAM(s) SubCutaneous every 24 hours  insulin lispro (HumaLOG) corrective regimen sliding scale   SubCutaneous three times a day before meals  insulin lispro (HumaLOG) corrective regimen sliding scale   SubCutaneous at bedtime  levothyroxine 88 MICROGram(s) Oral daily  melatonin 5 milliGRAM(s) Oral at bedtime  ondansetron    Tablet 4 milliGRAM(s) Oral every 4 hours PRN  predniSONE   Tablet 40 milliGRAM(s) Oral two times a day  QUEtiapine 25 milliGRAM(s) Oral every 6 hours PRN  sertraline 50 milliGRAM(s) Oral daily      Vital Signs Last 24 Hrs  T(C): 36.6 (26 Jan 2018 08:03), Max: 36.6 (25 Jan 2018 20:10)  T(F): 97.8 (26 Jan 2018 08:03), Max: 97.9 (25 Jan 2018 20:10)  HR: 90 (26 Jan 2018 08:03) (90 - 95)  BP: 165/82 (26 Jan 2018 08:03) (124/78 - 165/82)  BP(mean): --  RR: 18 (26 Jan 2018 08:03) (18 - 20)  SpO2: 98% (26 Jan 2018 08:03) (98% - 98%)    Physical Exam:  General:    NAD,  non toxic  HEENT:    no oropharyngeal lesions,   no LAD,   neck supple  Cardio:     regular S1, S2,  no murmur  Respiratory:    clear b/l,    no wheezing  abd:     soft,   BS +,   no tenderness,    no organomegaly  :   no CVAT,  no suprapubic tenderness,   no  bowser  Musculoskeletal:   no joint swelling,   no edema  vascular: no lines, normal pulses  Skin:    no rash  Neurologic:     no focal deficit  psych: normal affect, no suicidal ideation                          12.1   13.0  )-----------( 270      ( 24 Jan 2018 07:35 )             33.0       01-24    135  |  99  |  11  ----------------------------<  192<H>  3.9   |  20<L>  |  1.05    Ca    9.1      24 Jan 2018 07:35            MICROBIOLOGY:    BAL AFB: negative  BAL cx : negative  BAL fungal: in progress    Rapid RVP Result: NotDetec (01-20 @ 14:40)    PATHOLOGY:    Surgical Pathology   1. Lung, left upper lobe, transbronchial biopsy  - Bronchial wall with mild chronic inflammation  - Pulmonary parenchymawith reactive pneumocyte hyperplasia  - Negative for granuloma or vasculitis  - GMS stain is negative for fungal/PCP organisms    2. Left bronchus intermedius, endobronchial biopsy  - Bronchial wall with chronic inflammation  - GMS stain is negative for fungal/PCP organisms      RADIOLOGY:    < from: CT Angio Chest w/ IV Cont (01.20.18 @ 08:50) >    IMPRESSION:   No pulmonary embolism.    Scattered bilateral perihilar groundglass opacities with interlobular   septal thickening. The differential diagnosis includes  mild pulmonary   edema and pneumonia.    Situs inversus.    Stenosis of the proximal celiac artery.      < from: MR Head w/wo IV Cont (01.23.18 @ 13:52) >    IMPRESSION: Minimal small vessel white matter ischemic changes. No acute   infarcts, hemorrhage or mass. No abnormal enhancement.            IMPRESSION: Minimal small vessel white matter ischemic changes. No acute   infarcts, hemorrhage or mass. No abnormal enhancement.

## 2018-01-26 NOTE — SWALLOW VFSS/MBS ASSESSMENT ADULT - RECOMMENDED FEEDING/EATING TECHNIQUES
position upright (90 degrees)/small sips/bites/oral hygiene/maintain upright posture during/after eating for 30 mins

## 2018-01-26 NOTE — PROGRESS NOTE ADULT - ASSESSMENT
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 Fungitell elevated, but negative giemna stain on lung biopsy. Clinical picture consistent with PCP pneumonia, with significant improvement with prednisone and stereoids.     - BAL and broch biopsy results reviewed with the patient  - continue bactrim to complete 21 days  - day4/5 of prednisone 40 mg BID, then 40 mg daily for 5 days, then reduce to 40 daily  - needs rheum follow up to decide if treatement needed for PMR  - should be no PCP prophylaxis if needs to be on high dose steroids in future  - physical therapy eval prior to discharge. 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 Fungitell elevated, but negative GMS stain on lung biopsy. Clinical picture consistent with PCP pneumonia, with significant improvement with prednisone and stereoids.     - BAL and broch biopsy results reviewed with the patient  - continue bactrim to complete 21 days  - day4/5 of prednisone 40 mg BID, then 40 mg daily for 5 days, then reduce to 40 daily  - needs rheum follow up to decide if treatement needed for PMR  - should be no PCP prophylaxis if needs to be on high dose steroids in future  - physical therapy eval prior to discharge.

## 2018-01-27 VITALS
OXYGEN SATURATION: 95 % | DIASTOLIC BLOOD PRESSURE: 79 MMHG | TEMPERATURE: 99 F | RESPIRATION RATE: 18 BRPM | HEART RATE: 105 BPM | SYSTOLIC BLOOD PRESSURE: 138 MMHG

## 2018-01-27 LAB
GLUCOSE BLDC GLUCOMTR-MCNC: 94 MG/DL — SIGNIFICANT CHANGE UP (ref 70–99)
GLUCOSE BLDC GLUCOMTR-MCNC: 97 MG/DL — SIGNIFICANT CHANGE UP (ref 70–99)
H CAPSUL MYC AB FLD-ACNC: SIGNIFICANT CHANGE UP
H CAPSUL YST AB FLD-ACNC: SIGNIFICANT CHANGE UP

## 2018-01-27 PROCEDURE — 87102 FUNGUS ISOLATION CULTURE: CPT

## 2018-01-27 PROCEDURE — 84480 ASSAY TRIIODOTHYRONINE (T3): CPT

## 2018-01-27 PROCEDURE — 87449 NOS EACH ORGANISM AG IA: CPT

## 2018-01-27 PROCEDURE — 84132 ASSAY OF SERUM POTASSIUM: CPT

## 2018-01-27 PROCEDURE — 92610 EVALUATE SWALLOWING FUNCTION: CPT

## 2018-01-27 PROCEDURE — 86140 C-REACTIVE PROTEIN: CPT

## 2018-01-27 PROCEDURE — 86698 HISTOPLASMA ANTIBODY: CPT

## 2018-01-27 PROCEDURE — 82550 ASSAY OF CK (CPK): CPT

## 2018-01-27 PROCEDURE — 85014 HEMATOCRIT: CPT

## 2018-01-27 PROCEDURE — 81001 URINALYSIS AUTO W/SCOPE: CPT

## 2018-01-27 PROCEDURE — 87015 SPECIMEN INFECT AGNT CONCNTJ: CPT

## 2018-01-27 PROCEDURE — 87798 DETECT AGENT NOS DNA AMP: CPT

## 2018-01-27 PROCEDURE — 84436 ASSAY OF TOTAL THYROXINE: CPT

## 2018-01-27 PROCEDURE — 87486 CHLMYD PNEUM DNA AMP PROBE: CPT

## 2018-01-27 PROCEDURE — 82803 BLOOD GASES ANY COMBINATION: CPT

## 2018-01-27 PROCEDURE — 84100 ASSAY OF PHOSPHORUS: CPT

## 2018-01-27 PROCEDURE — 87070 CULTURE OTHR SPECIMN AEROBIC: CPT

## 2018-01-27 PROCEDURE — 86036 ANCA SCREEN EACH ANTIBODY: CPT

## 2018-01-27 PROCEDURE — 96374 THER/PROPH/DIAG INJ IV PUSH: CPT | Mod: XU

## 2018-01-27 PROCEDURE — 80307 DRUG TEST PRSMV CHEM ANLYZR: CPT

## 2018-01-27 PROCEDURE — 86160 COMPLEMENT ANTIGEN: CPT

## 2018-01-27 PROCEDURE — 84156 ASSAY OF PROTEIN URINE: CPT

## 2018-01-27 PROCEDURE — 84484 ASSAY OF TROPONIN QUANT: CPT

## 2018-01-27 PROCEDURE — 87633 RESP VIRUS 12-25 TARGETS: CPT

## 2018-01-27 PROCEDURE — 97161 PT EVAL LOW COMPLEX 20 MIN: CPT

## 2018-01-27 PROCEDURE — 83615 LACTATE (LD) (LDH) ENZYME: CPT

## 2018-01-27 PROCEDURE — 82330 ASSAY OF CALCIUM: CPT

## 2018-01-27 PROCEDURE — 85730 THROMBOPLASTIN TIME PARTIAL: CPT

## 2018-01-27 PROCEDURE — 82947 ASSAY GLUCOSE BLOOD QUANT: CPT

## 2018-01-27 PROCEDURE — 74230 X-RAY XM SWLNG FUNCJ C+: CPT

## 2018-01-27 PROCEDURE — 87206 SMEAR FLUORESCENT/ACID STAI: CPT

## 2018-01-27 PROCEDURE — 71046 X-RAY EXAM CHEST 2 VIEWS: CPT

## 2018-01-27 PROCEDURE — 86431 RHEUMATOID FACTOR QUANT: CPT

## 2018-01-27 PROCEDURE — 80048 BASIC METABOLIC PNL TOTAL CA: CPT

## 2018-01-27 PROCEDURE — 87116 MYCOBACTERIA CULTURE: CPT

## 2018-01-27 PROCEDURE — 93005 ELECTROCARDIOGRAM TRACING: CPT

## 2018-01-27 PROCEDURE — 70553 MRI BRAIN STEM W/O & W/DYE: CPT

## 2018-01-27 PROCEDURE — 83516 IMMUNOASSAY NONANTIBODY: CPT

## 2018-01-27 PROCEDURE — 82553 CREATINE MB FRACTION: CPT

## 2018-01-27 PROCEDURE — 85027 COMPLETE CBC AUTOMATED: CPT

## 2018-01-27 PROCEDURE — A9585: CPT

## 2018-01-27 PROCEDURE — 87385 HISTOPLASMA CAPSUL AG IA: CPT

## 2018-01-27 PROCEDURE — 82962 GLUCOSE BLOOD TEST: CPT

## 2018-01-27 PROCEDURE — 80053 COMPREHEN METABOLIC PANEL: CPT

## 2018-01-27 PROCEDURE — 87581 M.PNEUMON DNA AMP PROBE: CPT

## 2018-01-27 PROCEDURE — 76000 FLUOROSCOPY <1 HR PHYS/QHP: CPT

## 2018-01-27 PROCEDURE — 88112 CYTOPATH CELL ENHANCE TECH: CPT

## 2018-01-27 PROCEDURE — 83880 ASSAY OF NATRIURETIC PEPTIDE: CPT

## 2018-01-27 PROCEDURE — 83735 ASSAY OF MAGNESIUM: CPT

## 2018-01-27 PROCEDURE — 85652 RBC SED RATE AUTOMATED: CPT

## 2018-01-27 PROCEDURE — 92611 MOTION FLUOROSCOPY/SWALLOW: CPT

## 2018-01-27 PROCEDURE — 36600 WITHDRAWAL OF ARTERIAL BLOOD: CPT

## 2018-01-27 PROCEDURE — 89051 BODY FLUID CELL COUNT: CPT

## 2018-01-27 PROCEDURE — 88173 CYTOPATH EVAL FNA REPORT: CPT

## 2018-01-27 PROCEDURE — 88305 TISSUE EXAM BY PATHOLOGIST: CPT

## 2018-01-27 PROCEDURE — 88312 SPECIAL STAINS GROUP 1: CPT

## 2018-01-27 PROCEDURE — 71045 X-RAY EXAM CHEST 1 VIEW: CPT

## 2018-01-27 PROCEDURE — 85610 PROTHROMBIN TIME: CPT

## 2018-01-27 PROCEDURE — 71275 CT ANGIOGRAPHY CHEST: CPT

## 2018-01-27 PROCEDURE — 83605 ASSAY OF LACTIC ACID: CPT

## 2018-01-27 PROCEDURE — 93306 TTE W/DOPPLER COMPLETE: CPT

## 2018-01-27 PROCEDURE — 82435 ASSAY OF BLOOD CHLORIDE: CPT

## 2018-01-27 PROCEDURE — 83036 HEMOGLOBIN GLYCOSYLATED A1C: CPT

## 2018-01-27 PROCEDURE — 84443 ASSAY THYROID STIM HORMONE: CPT

## 2018-01-27 PROCEDURE — 82595 ASSAY OF CRYOGLOBULIN: CPT

## 2018-01-27 PROCEDURE — 84295 ASSAY OF SERUM SODIUM: CPT

## 2018-01-27 PROCEDURE — 86038 ANTINUCLEAR ANTIBODIES: CPT

## 2018-01-27 PROCEDURE — 99285 EMERGENCY DEPT VISIT HI MDM: CPT | Mod: 25

## 2018-01-27 RX ADMIN — Medication 88 MICROGRAM(S): at 06:12

## 2018-01-27 RX ADMIN — Medication 2 TABLET(S): at 15:02

## 2018-01-27 RX ADMIN — ENOXAPARIN SODIUM 40 MILLIGRAM(S): 100 INJECTION SUBCUTANEOUS at 06:12

## 2018-01-27 RX ADMIN — Medication 40 MILLIGRAM(S): at 06:12

## 2018-01-27 RX ADMIN — Medication 2 TABLET(S): at 06:12

## 2018-01-27 RX ADMIN — ONDANSETRON 4 MILLIGRAM(S): 8 TABLET, FILM COATED ORAL at 06:17

## 2018-01-27 RX ADMIN — SERTRALINE 50 MILLIGRAM(S): 25 TABLET, FILM COATED ORAL at 12:53

## 2018-01-27 NOTE — PROGRESS NOTE ADULT - PROBLEM SELECTOR PLAN 8
DVT: lovenox DVT: elijah Aldana MD  PGY-3 Internal Medicine Resident  [facility] Contact: [phone]  [facility] Contact: Pager #[phone]

## 2018-01-27 NOTE — PROGRESS NOTE ADULT - PROBLEM SELECTOR PLAN 5
- no personal or family history of kidney disease, no history of diabetes  - reports darkening in color of urine, no other symptoms  - urine protein-creat ratio wnl  - f/u rheumatologic w/u as above - no personal or family history of kidney disease, no history of diabetes  - reports darkening in color of urine, no other symptoms  - urine protein-creat ratio wnl  - f/u rheumatologic outpatient

## 2018-01-27 NOTE — PROGRESS NOTE ADULT - PROVIDER SPECIALTY LIST ADULT
Cardiology
Infectious Disease
Infectious Disease
Internal Medicine
Psychiatry
Pulmonology
Rheumatology
Infectious Disease
Internal Medicine
Pulmonology
Infectious Disease
Internal Medicine

## 2018-01-27 NOTE — PROGRESS NOTE ADULT - PROBLEM SELECTOR PLAN 2
Afebrile, Has been on IV Bactrim and prednisone per ID/Pulmonary recs  - Fungitell >500, Galactomannan and histoplasma Ag neg.    CT chest showed b/l ground glass opacity  - BAL post bronch biopsy -ve for fungus and PCP

## 2018-01-27 NOTE — PROGRESS NOTE ADULT - PROBLEM SELECTOR PLAN 3
Likely psychosomatic, S & S evaluated. Showed esophageal gag. Spoke to patient to have Ba esophagram but she refused, wants to f/u outpatient with GI if continues to have dysphagia  - Ach receptor Ab

## 2018-01-27 NOTE — PROGRESS NOTE ADULT - PROBLEM SELECTOR PLAN 6
- c/w zoloft home dose  - very tearful and anxious this AM, exacerbation of emotion could be 2/2 steroids  - psych consulted, seroquel prn q6 for anxiety, insomnia - c/w zoloft home dose

## 2018-01-27 NOTE — PROGRESS NOTE ADULT - PROBLEM SELECTOR PLAN 1
Hypoxemic at 88% off O2 while ambulating per RN today.  - progressively worsening SOB with palpitations, weakness and fatigue for 1 month, differential includes infectious(?PCP) vs rheumatologic (vasculitis, connective tissue disease) vs. interstitial lung disease  - saturating 93-97% on RA at rest, not tachypneic or tachycardic at this time  - EKG showed sinus tachycardia on admission  - CTA as above, b/l ground glass opacities, negative for PE  - no evidence of fluid overload, ischemic changes on EKG, troponins neg x2, ECHO wnl  - afebrile, no leukocytosis on admission, however given CT findings and long term steroids, concern for PCP, started on bactrim and high dose steroids for hypoxia - now with leukocytosis  - antiGBM negative, ANCA negative, RF negative, C3 and C4 wnl  - OLEKSANDR 1:160  - fungitell >500  - pulm consulted - bronch done 1/24, f/u results  - ID following - MR brain negative Hypoxemic at 88% off O2 while ambulating per RN today.  - progressively worsening SOB with palpitations, weakness and fatigue for 1 month, differential includes infectious(?PCP) vs rheumatologic (vasculitis, connective tissue disease) vs. interstitial lung disease  - saturating 93-97% on RA at rest, not tachypneic or tachycardic at this time  - EKG showed sinus tachycardia on admission  - CTA as above, b/l ground glass opacities, negative for PE  - no evidence of fluid overload, ischemic changes on EKG, troponins neg x2, ECHO wnl  - afebrile, no leukocytosis on admission, however given CT findings and long term steroids, concern for PCP, started on bactrim and high dose steroids for hypoxia - now with leukocytosis  - antiGBM negative, ANCA negative, RF negative, C3 and C4 wnl  - OLEKSANDR 1:160  - fungitell >500  - pulm consulted - bronch done 1/24, f/u results  - ID following - MR brain negative  - Patient now afebrile, not hypoxic, stable for d/c and follow up outpatient

## 2018-01-27 NOTE — PROGRESS NOTE ADULT - ATTENDING COMMENTS
Agree with above. Patient is doing well. Saturating improved and is currently 96% on RA (several days ago was 88% on RA) which suggests PCP treatment may be helping. Will follow up results of bronchoscopy. Need eventual ambulatory room air saturation to assess need for oxygen prior to discharge.
Agree with above. s/p bronchoscopy with bronchoalveolar lavage and transbronchial biopsies. Await micro, cytopath, and pathology results. On empiric treatment for PCP.
I personally examined this patient
Clinically improving.  Maintaining saturation of 96% on room air with ambulation.  Biopsy results were negative for PCP but fungitell elevated and CT chest and clinical picture most consistent.  Continue full treatment for PCP with bactrim and prednisone taper as above.  Stable for discharge from pulmonary perspective.  She can follow up with me as an outpatient at [phone].
pt seen and examined by me personally   agree with assessment and plan as above
Face to face visit was on 1/23/2018. Agree with above. Stable respiratory status. Planned for bronchoscopy tomorrow. Keep NPO after midnight.
Pt seen and examined at bedside.  Reports some improvement in SOB but feels better on oxygen supplementation. O2 sat on room air 90- 92%. Saturating 95% on 1L.  On exam, NAD, AAO X 3, tachycardic at 102 bpm, chest is clear b/l to auscultation.  Also c/o low back pain which she reports has been on going x 1 week.   Pt requesting to be seen by [facility].   RF : negative, other rheum w/up pending.  c/w Prednisone/synthroid/zoloft/ tylenol prn for pain. DVT ppx.  PT eval.
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 CT chest showed ground glass opacity both lung, s/p bronch with BAL on 1/24  - Respiratory wise better, afebrile. c/o dysphagia on solid  - c/w IV Bactrim and Prednisone. ID and Pulmonary plans noted  - S & S eval  - awaiting on BAL biopsy, c/s results
Went to have bronch by Pulmonary  - afebrile, no respiratory distress noted.   Reviewed labs- ABG 7.4/30/62/93%  - would c/w Bactrim, prednisone for suspected PCP given CT chest findings and recent steroid uses  - Will f/u Pulmonary and ID plans  - Psychiatry consult appreciated
Not in respiratory distress, had uneventful night- less sleep, felt body is numb, head is blowing, cried  - Afebrile, no chest pain, cough, on O2  - Reviewed labs, imaging  and ID, pulmonary, Rheum, cardiology- On prednisone 40mg/d, Bactrim 15mg/kg iv q 8hrs  - awaiting on Bronch tomorrow,   - Psych consult called- Dr Benjamin  - ABG, Echo
Continue bactrim and perdnisone taper for presumed PCP.   Follow up with ID, Pulmonary as outpatient     Patient medically cleared for discharge home today.   Discharge order in, RN and RN manager notified.  More than 50 minutes spent coordinating discharge.
Hypoxemic at 88% off O2 while ambulating per RN today.  -Reviewed labs, imaging. Has elevated , CTA chest showed B/L ground glass opacities.  - Pulmonary and ID consults called. Might need Bactrim for possible PCP Pna and/or BAL  - c/w Rheum plan & current meds
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 Patient refused further w/u- Ba esophagram, GI input    Outpatient f/u with GI. On Puree diet (spoke to Speech pathologist)  - Reviewed labs, Imaging and ID, Pulmonary input. 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 Outpatient f/u with ID, Pulmonary and Rheumatology

## 2018-01-27 NOTE — PROGRESS NOTE ADULT - SUBJECTIVE AND OBJECTIVE BOX
Patient is a 67y old  Female who presents with a chief complaint of SOB, palpitations (20 Jan 2018 14:58)    SUBJECTIVE / OVERNIGHT EVENTS: No acute events overnight. Says she feels weak but no SOB or pain anywhere.     MEDICATIONS  (STANDING):  enoxaparin Injectable 40 milliGRAM(s) SubCutaneous every 24 hours  insulin lispro (HumaLOG) corrective regimen sliding scale   SubCutaneous three times a day before meals  insulin lispro (HumaLOG) corrective regimen sliding scale   SubCutaneous at bedtime  levothyroxine 88 MICROGram(s) Oral daily  melatonin 5 milliGRAM(s) Oral at bedtime  predniSONE   Tablet 40 milliGRAM(s) Oral two times a day  sertraline 50 milliGRAM(s) Oral daily  trimethoprim  160 mG/sulfamethoxazole 800 mG 2 Tablet(s) Oral every 8 hours    MEDICATIONS  (PRN):  acetaminophen   Tablet. 650 milliGRAM(s) Oral every 6 hours PRN mild or moderate pain  ondansetron    Tablet 4 milliGRAM(s) Oral every 4 hours PRN Nausea and/or Vomiting  QUEtiapine 25 milliGRAM(s) Oral every 6 hours PRN agitation, anxiety, insomnia    CAPILLARY BLOOD GLUCOSE  POCT Blood Glucose.: 97 mg/dL (27 Jan 2018 08:24)    Vital Signs Last 24 Hrs  T(C): 36.9 (27 Jan 2018 07:49), Max: 36.9 (27 Jan 2018 07:49)  T(F): 98.4 (27 Jan 2018 07:49), Max: 98.4 (27 Jan 2018 07:49)  HR: 81 (27 Jan 2018 07:49) (71 - 85)  BP: 133/83 (27 Jan 2018 07:49) (126/82 - 151/85)  RR: 18 (27 Jan 2018 07:49) (18 - 18)  SpO2: 95% (27 Jan 2018 07:49) (95% - 97%)    GENERAL: tearful, not in respiratory distress on RA  HEAD/FACE:  Atraumatic, Normocephalic  EYES: EOMI, PERRLA, conjunctiva and sclera clear  NECK: Supple, No JVD  CHEST/LUNG: Clear to auscultation bilaterally; No wheezes or rales  HEART: Regular rate and rhythm; No murmurs, rubs, or gallops  ABDOMEN: Soft, Nontender, Nondistended; Bowel sounds present  BACK: ttp of paraspinal muscles in thoracic region, ttp at SI joints, no tenderness on spine  EXTREMITIES:  2+ Peripheral Pulses, No clubbing, cyanosis, or edema, no ttp  PSYCH: AAOx3  NEUROLOGY: non-focal  SKIN: no rashes or lesions, redness on face resolved    LABS:  (01-25 @ 09:04)                        11.8  14.23 )-----------( 191                 34.2    Neutrophils = 10.10 (71.0%)  Lymphocytes = 2.56 (18.0%)  Eosinophils = 0.00 (0.0%)  Basophils = 0.00 (0.0%)  Monocytes = 0.71 (5.0%)  Bands = --%    WBC Trend: 14.23<--, 13.0<--, 7.96<--  Hb Trend: 11.8<--, 12.1<--, 11.3<--, 12.5<--, 13.1<--  Plt Trend: 191<--, 270<--, 159<--, 197<--, 198<--  01-25    135  |  99  |  12  ----------------------------<  93  5.3   |  23  |  0.98    Ca    9.2      25 Jan 2018 09:04      Creatinine Trend: 0.98<--, 1.05<--, 0.96<--, 0.89<--, 0.81<--, 0.87<--            Microbiology:  galactomannan negative  histoplasma negative  gram stain of BAL negative    RADIOLOGY & ADDITIONAL TESTS:  MBS pending    Consultant(s) Notes Reviewed:  pulm, ID    Care Discussed with Consultants/Other Providers: Patient is a 67y old  Female who presents with a chief complaint of SOB, palpitations (20 Jan 2018 14:58)    SUBJECTIVE / OVERNIGHT EVENTS: No acute events overnight. Says she feels weak but no SOB or pain anywhere.     MEDICATIONS  (STANDING):  enoxaparin Injectable 40 milliGRAM(s) SubCutaneous every 24 hours  insulin lispro (HumaLOG) corrective regimen sliding scale   SubCutaneous three times a day before meals  insulin lispro (HumaLOG) corrective regimen sliding scale   SubCutaneous at bedtime  levothyroxine 88 MICROGram(s) Oral daily  melatonin 5 milliGRAM(s) Oral at bedtime  predniSONE   Tablet 40 milliGRAM(s) Oral two times a day  sertraline 50 milliGRAM(s) Oral daily  trimethoprim  160 mG/sulfamethoxazole 800 mG 2 Tablet(s) Oral every 8 hours    MEDICATIONS  (PRN):  acetaminophen   Tablet. 650 milliGRAM(s) Oral every 6 hours PRN mild or moderate pain  ondansetron    Tablet 4 milliGRAM(s) Oral every 4 hours PRN Nausea and/or Vomiting  QUEtiapine 25 milliGRAM(s) Oral every 6 hours PRN agitation, anxiety, insomnia    CAPILLARY BLOOD GLUCOSE  POCT Blood Glucose.: 97 mg/dL (27 Jan 2018 08:24)    Vital Signs Last 24 Hrs  T(C): 36.9 (27 Jan 2018 07:49), Max: 36.9 (27 Jan 2018 07:49)  T(F): 98.4 (27 Jan 2018 07:49), Max: 98.4 (27 Jan 2018 07:49)  HR: 81 (27 Jan 2018 07:49) (71 - 85)  BP: 133/83 (27 Jan 2018 07:49) (126/82 - 151/85)  RR: 18 (27 Jan 2018 07:49) (18 - 18)  SpO2: 95% (27 Jan 2018 07:49) (95% - 97%)    GENERAL: tearful, not in respiratory distress on RA  HEAD/FACE:  Atraumatic, Normocephalic  EYES: EOMI, PERRLA, conjunctiva and sclera clear  NECK: Supple, No JVD  CHEST/LUNG: Clear to auscultation bilaterally; No wheezes or rales  HEART: Regular rate and rhythm; No murmurs, rubs, or gallops  ABDOMEN: Soft, Nontender, Nondistended; Bowel sounds present  BACK: ttp of paraspinal muscles in thoracic region, ttp at SI joints, no tenderness on spine  EXTREMITIES:  2+ Peripheral Pulses, No clubbing, cyanosis, or edema, no ttp  PSYCH: AAOx3  NEUROLOGY: non-focal  SKIN: no rashes or lesions, redness on face resolved    LABS:      N/A    Microbiology:  galactomannan negative  histoplasma negative  gram stain of BAL negative    RADIOLOGY & ADDITIONAL TESTS:  MBS pending

## 2018-01-31 ENCOUNTER — EMERGENCY (EMERGENCY)
Facility: HOSPITAL | Age: 68
LOS: 1 days | Discharge: HOME | End: 2018-01-31
Attending: EMERGENCY MEDICINE | Admitting: EMERGENCY MEDICINE
Payer: MEDICARE

## 2018-01-31 VITALS
DIASTOLIC BLOOD PRESSURE: 84 MMHG | SYSTOLIC BLOOD PRESSURE: 123 MMHG | OXYGEN SATURATION: 95 % | HEART RATE: 93 BPM | RESPIRATION RATE: 16 BRPM

## 2018-01-31 LAB
ALBUMIN SERPL ELPH-MCNC: 3.8 G/DL — SIGNIFICANT CHANGE UP (ref 3.3–5)
ALP SERPL-CCNC: 62 U/L — SIGNIFICANT CHANGE UP (ref 40–120)
ALT FLD-CCNC: 43 U/L RC — SIGNIFICANT CHANGE UP (ref 10–45)
ANION GAP SERPL CALC-SCNC: 17 MMOL/L — SIGNIFICANT CHANGE UP (ref 5–17)
APTT BLD: 24.3 SEC — LOW (ref 27.5–37.4)
AST SERPL-CCNC: 19 U/L — SIGNIFICANT CHANGE UP (ref 10–40)
BASE EXCESS BLDV CALC-SCNC: -3.9 MMOL/L — LOW (ref -2–2)
BASOPHILS # BLD AUTO: 0 K/UL — SIGNIFICANT CHANGE UP (ref 0–0.2)
BASOPHILS NFR BLD AUTO: 0.3 % — SIGNIFICANT CHANGE UP (ref 0–2)
BILIRUB SERPL-MCNC: 0.2 MG/DL — SIGNIFICANT CHANGE UP (ref 0.2–1.2)
BUN SERPL-MCNC: 20 MG/DL — SIGNIFICANT CHANGE UP (ref 7–23)
CA-I SERPL-SCNC: 1.19 MMOL/L — SIGNIFICANT CHANGE UP (ref 1.12–1.3)
CALCIUM SERPL-MCNC: 9.1 MG/DL — SIGNIFICANT CHANGE UP (ref 8.4–10.5)
CHLORIDE BLDV-SCNC: 103 MMOL/L — SIGNIFICANT CHANGE UP (ref 96–108)
CHLORIDE SERPL-SCNC: 96 MMOL/L — SIGNIFICANT CHANGE UP (ref 96–108)
CO2 BLDV-SCNC: 21 MMOL/L — LOW (ref 22–30)
CO2 SERPL-SCNC: 21 MMOL/L — LOW (ref 22–31)
CREAT SERPL-MCNC: 1.25 MG/DL — SIGNIFICANT CHANGE UP (ref 0.5–1.3)
EOSINOPHIL # BLD AUTO: 0.1 K/UL — SIGNIFICANT CHANGE UP (ref 0–0.5)
EOSINOPHIL NFR BLD AUTO: 0.6 % — SIGNIFICANT CHANGE UP (ref 0–6)
GAS PNL BLDV: 128 MMOL/L — LOW (ref 136–145)
GAS PNL BLDV: SIGNIFICANT CHANGE UP
GAS PNL BLDV: SIGNIFICANT CHANGE UP
GLUCOSE BLDV-MCNC: 147 MG/DL — HIGH (ref 70–99)
GLUCOSE SERPL-MCNC: 147 MG/DL — HIGH (ref 70–99)
HCO3 BLDV-SCNC: 20 MMOL/L — LOW (ref 21–29)
HCT VFR BLD CALC: 37.8 % — SIGNIFICANT CHANGE UP (ref 34.5–45)
HCT VFR BLDA CALC: 41 % — SIGNIFICANT CHANGE UP (ref 39–50)
HGB BLD CALC-MCNC: 13.3 G/DL — SIGNIFICANT CHANGE UP (ref 11.5–15.5)
HGB BLD-MCNC: 13.7 G/DL — SIGNIFICANT CHANGE UP (ref 11.5–15.5)
INR BLD: 1.07 RATIO — SIGNIFICANT CHANGE UP (ref 0.88–1.16)
LACTATE BLDV-MCNC: 1.6 MMOL/L — SIGNIFICANT CHANGE UP (ref 0.7–2)
LIDOCAIN IGE QN: 45 U/L — SIGNIFICANT CHANGE UP (ref 7–60)
LYMPHOCYTES # BLD AUTO: 1 K/UL — SIGNIFICANT CHANGE UP (ref 1–3.3)
LYMPHOCYTES # BLD AUTO: 9.1 % — LOW (ref 13–44)
MAGNESIUM SERPL-MCNC: 2 MG/DL — SIGNIFICANT CHANGE UP (ref 1.6–2.6)
MCHC RBC-ENTMCNC: 34.6 PG — HIGH (ref 27–34)
MCHC RBC-ENTMCNC: 36.2 GM/DL — HIGH (ref 32–36)
MCV RBC AUTO: 95.6 FL — SIGNIFICANT CHANGE UP (ref 80–100)
MISCELLANEOUS TEST NAME: SIGNIFICANT CHANGE UP
MONOCYTES # BLD AUTO: 0.1 K/UL — SIGNIFICANT CHANGE UP (ref 0–0.9)
MONOCYTES NFR BLD AUTO: 0.6 % — LOW (ref 2–14)
NEUTROPHILS # BLD AUTO: 9.7 K/UL — HIGH (ref 1.8–7.4)
NEUTROPHILS NFR BLD AUTO: 89.4 % — HIGH (ref 43–77)
PCO2 BLDV: 34 MMHG — LOW (ref 35–50)
PH BLDV: 7.38 — SIGNIFICANT CHANGE UP (ref 7.35–7.45)
PHOSPHATE SERPL-MCNC: 2.1 MG/DL — LOW (ref 2.5–4.5)
PLATELET # BLD AUTO: 378 K/UL — SIGNIFICANT CHANGE UP (ref 150–400)
PO2 BLDV: 42 MMHG — SIGNIFICANT CHANGE UP (ref 25–45)
POTASSIUM BLDV-SCNC: 4.3 MMOL/L — SIGNIFICANT CHANGE UP (ref 3.5–5)
POTASSIUM SERPL-MCNC: 4.5 MMOL/L — SIGNIFICANT CHANGE UP (ref 3.5–5.3)
POTASSIUM SERPL-SCNC: 4.5 MMOL/L — SIGNIFICANT CHANGE UP (ref 3.5–5.3)
PROCALCITONIN SERPL-MCNC: 0.07 NG/ML — HIGH (ref 0–0.04)
PROT SERPL-MCNC: 7.2 G/DL — SIGNIFICANT CHANGE UP (ref 6–8.3)
PROTHROM AB SERPL-ACNC: 11.6 SEC — SIGNIFICANT CHANGE UP (ref 9.8–12.7)
RBC # BLD: 3.95 M/UL — SIGNIFICANT CHANGE UP (ref 3.8–5.2)
RBC # FLD: 14.1 % — SIGNIFICANT CHANGE UP (ref 10.3–14.5)
SAO2 % BLDV: 77 % — SIGNIFICANT CHANGE UP (ref 67–88)
SODIUM SERPL-SCNC: 130 MMOL/L — LOW (ref 135–145)
TROPONIN T SERPL-MCNC: <0.01 NG/ML — SIGNIFICANT CHANGE UP (ref 0–0.06)
WBC # BLD: 10.9 K/UL — HIGH (ref 3.8–10.5)
WBC # FLD AUTO: 10.9 K/UL — HIGH (ref 3.8–10.5)

## 2018-01-31 PROCEDURE — 93010 ELECTROCARDIOGRAM REPORT: CPT

## 2018-01-31 PROCEDURE — 71045 X-RAY EXAM CHEST 1 VIEW: CPT | Mod: 26

## 2018-01-31 PROCEDURE — 99220: CPT | Mod: 25

## 2018-01-31 RX ORDER — MULTIVIT WITH MIN/MFOLATE/K2 340-15/3 G
150 POWDER (GRAM) ORAL ONCE
Qty: 0 | Refills: 0 | Status: COMPLETED | OUTPATIENT
Start: 2018-01-31 | End: 2018-01-31

## 2018-01-31 RX ORDER — LEVOTHYROXINE SODIUM 125 MCG
88 TABLET ORAL DAILY
Qty: 0 | Refills: 0 | Status: DISCONTINUED | OUTPATIENT
Start: 2018-01-31 | End: 2018-02-04

## 2018-01-31 RX ORDER — FAMOTIDINE 10 MG/ML
20 INJECTION INTRAVENOUS ONCE
Qty: 0 | Refills: 0 | Status: COMPLETED | OUTPATIENT
Start: 2018-01-31 | End: 2018-01-31

## 2018-01-31 RX ORDER — SERTRALINE 25 MG/1
50 TABLET, FILM COATED ORAL DAILY
Qty: 0 | Refills: 0 | Status: DISCONTINUED | OUTPATIENT
Start: 2018-01-31 | End: 2018-02-04

## 2018-01-31 RX ORDER — SODIUM CHLORIDE 9 MG/ML
1000 INJECTION INTRAMUSCULAR; INTRAVENOUS; SUBCUTANEOUS
Qty: 0 | Refills: 0 | Status: DISCONTINUED | OUTPATIENT
Start: 2018-01-31 | End: 2018-02-04

## 2018-01-31 RX ORDER — SODIUM CHLORIDE 9 MG/ML
2000 INJECTION INTRAMUSCULAR; INTRAVENOUS; SUBCUTANEOUS ONCE
Qty: 0 | Refills: 0 | Status: COMPLETED | OUTPATIENT
Start: 2018-01-31 | End: 2018-01-31

## 2018-01-31 RX ADMIN — Medication 202 MILLIGRAM(S): at 21:30

## 2018-01-31 RX ADMIN — Medication 150 MILLILITER(S): at 23:29

## 2018-01-31 RX ADMIN — FAMOTIDINE 20 MILLIGRAM(S): 10 INJECTION INTRAVENOUS at 19:49

## 2018-01-31 RX ADMIN — Medication 30 MILLILITER(S): at 19:49

## 2018-01-31 RX ADMIN — SODIUM CHLORIDE 2000 MILLILITER(S): 9 INJECTION INTRAMUSCULAR; INTRAVENOUS; SUBCUTANEOUS at 19:49

## 2018-01-31 RX ADMIN — SERTRALINE 50 MILLIGRAM(S): 25 TABLET, FILM COATED ORAL at 23:28

## 2018-01-31 RX ADMIN — SODIUM CHLORIDE 100 MILLILITER(S): 9 INJECTION INTRAMUSCULAR; INTRAVENOUS; SUBCUTANEOUS at 22:52

## 2018-01-31 RX ADMIN — Medication 2 TABLET(S): at 23:29

## 2018-01-31 NOTE — ED PROVIDER NOTE - PROGRESS NOTE DETAILS
Pt admits only one episode of vomiting today, 30 minutes after she called 911. Pt primary reason in calling 91 was because she feels like she can't eat a lot because its "piling up in my stomach". Last BM was week ago right before being DC'd from [facility]. Pt states that she's so weak that she can only walk a few steps and then have to sit down. Pt insists on staying in the ED overnight because she's alone at home.

## 2018-01-31 NOTE — ED CDU PROVIDER INITIAL DAY NOTE - PROGRESS NOTE DETAILS
CDU NOTE BELGICA Pearl: upon entering CDU area, pt requested a sandwich and juice. pt ate and drank without issue, without dysphagia, without odynophagia.

## 2018-01-31 NOTE — ED CDU PROVIDER INITIAL DAY NOTE - DETAILS
Dr. Collazo Note: fluids, bowel regimen, tx hyponatremia, physical therapy in attempt to prevent readmission

## 2018-01-31 NOTE — ED PROVIDER NOTE - OBJECTIVE STATEMENT
66 y/o F pt with PMHx of depression, HLD, hypothyroidism, polymyalgia rheumatica c/o generalized weakness. Pt was DC'd 5 days ago and pt felt like she wasn't ready to go home. Pt was DC'd because her vital signs were stable with Abx and steroids. Notes decreased eating/drinking as if food was getting stuck in her stomach. Then pt admits to vomiting. Pt doesn't remember her last BM. Denies fever or any other complaints.  Internal Medicine: Dr. Diego Rocha

## 2018-01-31 NOTE — ED ADULT NURSE NOTE - OBJECTIVE STATEMENT
67 year old female presents to the ED complaining of weakness, fatigue, and vomiting. As per patient she was treated at [facility] last week for pneumonia and discharged this past Saturday on oral antibiotics and has felt weak, fatigues and has been vomiting at home. Pt is A&O x 4, VSS, afebrile, ambulating independently. Pt denies fever, chills, NVD, SOB, or chest pain. IV placed, labs drawn, bed in low position, safety measures in place. EKG completed and given to attending. on neuro assessment no focal or neurological deficits noted. lung sounds clear.

## 2018-01-31 NOTE — ED ADULT NURSE REASSESSMENT NOTE - NS ED NURSE REASSESS COMMENT FT1
Received pt from SILVANA Lindsey from ER , received pt alert and responsive, oriented x4, denies any respiratory distress, SOB, or difficulty breathing. Pt transferred to CDU for observation for generalized weakness and fatigue. Pt currently states she is feeling " weak all over" pending UA/ culture, pt aware.  IV in place, patent and free of signs of infiltration, IV fluids infusing as ordered, tolerating well. pt denies chest pain or palpitations, V/S stable, pt afebrile, pt denies pain at this time. Pt educated on unit and unit rules, instructed patient to notify RN of any needed assistance, Pt verbalizes understanding, Call bell placed within reach. Safety maintained. Will continue to monitor.

## 2018-01-31 NOTE — ED CDU PROVIDER INITIAL DAY NOTE - OBJECTIVE STATEMENT
66 y/o F pt with PMHx of depression, HLD, hypothyroidism, polymyalgia rheumatica on steroids c/o generalized weakness. pt reports that she has been having generalized weakness for almost 2 wks. pt also with SOB for about 2 wks but that's improving.  pt was dx with PCP pneumonia, admitted 11 days ago to Medicine floor. pt has last dose of Bactrim tonight.  pt was d/c 5 days ago but concerned she wasn't ready to go home.  pt reports that she is still experiencing generalized weakness. also c/o difficulty swallowing causing her to have difficulty eating.  However pt reports this was checked while she was inpatient and she was told it was associated with anxiety.  Pt feels that food is stuck in stomach and reports constipation for about 8 days.  pt reports V x 1 today. +flatus.  Denies fever or any other complaints. denies cp, lightheadedness, dizziness, numbness/paresthesias, weakness of arm/leg  Internal Medicine: Dr. Diego Rocha

## 2018-01-31 NOTE — ED ADULT NURSE REASSESSMENT NOTE - COMFORT CARE
repositioned/meal provided/darkened lights/warm blanket provided/plan of care explained/po fluids offered

## 2018-02-01 VITALS
TEMPERATURE: 98 F | HEART RATE: 98 BPM | SYSTOLIC BLOOD PRESSURE: 115 MMHG | RESPIRATION RATE: 16 BRPM | DIASTOLIC BLOOD PRESSURE: 76 MMHG | OXYGEN SATURATION: 98 %

## 2018-02-01 PROBLEM — E03.9 HYPOTHYROIDISM, UNSPECIFIED: Chronic | Status: ACTIVE | Noted: 2018-01-20

## 2018-02-01 PROBLEM — F32.9 MAJOR DEPRESSIVE DISORDER, SINGLE EPISODE, UNSPECIFIED: Chronic | Status: ACTIVE | Noted: 2018-01-20

## 2018-02-01 LAB
ANION GAP SERPL CALC-SCNC: 11 MMOL/L — SIGNIFICANT CHANGE UP (ref 5–17)
BUN SERPL-MCNC: 14 MG/DL — SIGNIFICANT CHANGE UP (ref 7–23)
CALCIUM SERPL-MCNC: 8.7 MG/DL — SIGNIFICANT CHANGE UP (ref 8.4–10.5)
CHLORIDE SERPL-SCNC: 101 MMOL/L — SIGNIFICANT CHANGE UP (ref 96–108)
CO2 SERPL-SCNC: 22 MMOL/L — SIGNIFICANT CHANGE UP (ref 22–31)
CREAT SERPL-MCNC: 1.03 MG/DL — SIGNIFICANT CHANGE UP (ref 0.5–1.3)
GLUCOSE SERPL-MCNC: 82 MG/DL — SIGNIFICANT CHANGE UP (ref 70–99)
HCT VFR BLD CALC: 34.6 % — SIGNIFICANT CHANGE UP (ref 34.5–45)
HGB BLD-MCNC: 12.4 G/DL — SIGNIFICANT CHANGE UP (ref 11.5–15.5)
MCHC RBC-ENTMCNC: 34.4 PG — HIGH (ref 27–34)
MCHC RBC-ENTMCNC: 35.9 GM/DL — SIGNIFICANT CHANGE UP (ref 32–36)
MCV RBC AUTO: 96 FL — SIGNIFICANT CHANGE UP (ref 80–100)
PLATELET # BLD AUTO: 349 K/UL — SIGNIFICANT CHANGE UP (ref 150–400)
POTASSIUM SERPL-MCNC: 5 MMOL/L — SIGNIFICANT CHANGE UP (ref 3.5–5.3)
POTASSIUM SERPL-SCNC: 5 MMOL/L — SIGNIFICANT CHANGE UP (ref 3.5–5.3)
RBC # BLD: 3.6 M/UL — LOW (ref 3.8–5.2)
RBC # FLD: 14.2 % — SIGNIFICANT CHANGE UP (ref 10.3–14.5)
SODIUM SERPL-SCNC: 134 MMOL/L — LOW (ref 135–145)
TSH SERPL-MCNC: 1.88 UIU/ML — SIGNIFICANT CHANGE UP (ref 0.27–4.2)
WBC # BLD: 10.2 K/UL — SIGNIFICANT CHANGE UP (ref 3.8–10.5)
WBC # FLD AUTO: 10.2 K/UL — SIGNIFICANT CHANGE UP (ref 3.8–10.5)

## 2018-02-01 PROCEDURE — 97161 PT EVAL LOW COMPLEX 20 MIN: CPT

## 2018-02-01 PROCEDURE — 84443 ASSAY THYROID STIM HORMONE: CPT

## 2018-02-01 PROCEDURE — 83605 ASSAY OF LACTIC ACID: CPT

## 2018-02-01 PROCEDURE — 84100 ASSAY OF PHOSPHORUS: CPT

## 2018-02-01 PROCEDURE — 85610 PROTHROMBIN TIME: CPT

## 2018-02-01 PROCEDURE — 82947 ASSAY GLUCOSE BLOOD QUANT: CPT

## 2018-02-01 PROCEDURE — 96375 TX/PRO/DX INJ NEW DRUG ADDON: CPT

## 2018-02-01 PROCEDURE — 85027 COMPLETE CBC AUTOMATED: CPT

## 2018-02-01 PROCEDURE — G8980: CPT | Mod: CI

## 2018-02-01 PROCEDURE — 84484 ASSAY OF TROPONIN QUANT: CPT

## 2018-02-01 PROCEDURE — 83690 ASSAY OF LIPASE: CPT

## 2018-02-01 PROCEDURE — 84132 ASSAY OF SERUM POTASSIUM: CPT

## 2018-02-01 PROCEDURE — 82330 ASSAY OF CALCIUM: CPT

## 2018-02-01 PROCEDURE — 82550 ASSAY OF CK (CPK): CPT

## 2018-02-01 PROCEDURE — 96374 THER/PROPH/DIAG INJ IV PUSH: CPT

## 2018-02-01 PROCEDURE — 85014 HEMATOCRIT: CPT

## 2018-02-01 PROCEDURE — 99217: CPT | Mod: 25

## 2018-02-01 PROCEDURE — 99284 EMERGENCY DEPT VISIT MOD MDM: CPT | Mod: 25

## 2018-02-01 PROCEDURE — G8978: CPT | Mod: CI

## 2018-02-01 PROCEDURE — 93005 ELECTROCARDIOGRAM TRACING: CPT

## 2018-02-01 PROCEDURE — 71045 X-RAY EXAM CHEST 1 VIEW: CPT

## 2018-02-01 PROCEDURE — 84145 PROCALCITONIN (PCT): CPT

## 2018-02-01 PROCEDURE — 83735 ASSAY OF MAGNESIUM: CPT

## 2018-02-01 PROCEDURE — 80053 COMPREHEN METABOLIC PANEL: CPT

## 2018-02-01 PROCEDURE — 80048 BASIC METABOLIC PNL TOTAL CA: CPT

## 2018-02-01 PROCEDURE — 84295 ASSAY OF SERUM SODIUM: CPT

## 2018-02-01 PROCEDURE — 82803 BLOOD GASES ANY COMBINATION: CPT

## 2018-02-01 PROCEDURE — G0378: CPT

## 2018-02-01 PROCEDURE — 85730 THROMBOPLASTIN TIME PARTIAL: CPT

## 2018-02-01 PROCEDURE — G8979: CPT | Mod: CI

## 2018-02-01 PROCEDURE — 82435 ASSAY OF BLOOD CHLORIDE: CPT

## 2018-02-01 RX ORDER — MULTIVIT WITH MIN/MFOLATE/K2 340-15/3 G
150 POWDER (GRAM) ORAL ONCE
Qty: 0 | Refills: 0 | Status: COMPLETED | OUTPATIENT
Start: 2018-02-01 | End: 2018-02-01

## 2018-02-01 RX ADMIN — Medication 150 MILLILITER(S): at 06:27

## 2018-02-01 RX ADMIN — Medication 88 MICROGRAM(S): at 05:15

## 2018-02-01 NOTE — PHYSICAL THERAPY INITIAL EVALUATION ADULT - PERTINENT HX OF CURRENT PROBLEM, REHAB EVAL
Pt is a 66 y/o F pt with PMHx of depression, HLD, hypothyroidism, polymyalgia rheumatica c/o generalized weakness. Pt was DC'd 5 days ago and pt felt like she wasn't ready to go home. Pt was DC'd because her vital signs were stable with Abx and steroids. Notes decreased eating/drinking as if food was getting stuck in her stomach. Then pt admits to vomiting.

## 2018-02-01 NOTE — ED CDU PROVIDER DISPOSITION NOTE - CLINICAL COURSE
66 y/o F pt with PMHx of depression, HLD, hypothyroidism, polymyalgia rheumatica on steroids c/o generalized weakness. pt reports that she has been having generalized weakness for almost 2 wks. pt also with SOB for about 2 wks but that's improving.  pt was dx with PCP pneumonia, admitted 11 days ago to Medicine floor. pt has last dose of Bactrim tonight.  pt was d/c 5 days ago but concerned she wasn't ready to go home.  pt reports that she is still experiencing generalized weakness. also c/o difficulty swallowing causing her to have difficulty eating.  However pt reports this was checked while she was inpatient and she was told it was associated with anxiety.  Pt feels that food is stuck in stomach and reports constipation for about 8 days.  pt reports V x 1 today. +flatus. pt sent to CDU for observation including IVF, repeat labs, PT in AM, Social Work in AM. pt's vital signs stable overnight. pt still had generalized weakness overnight, unchanged, no new symptoms. was able to eat sandwich without difficulty. 68 y/o F pt with PMHx of depression, HLD, hypothyroidism, polymyalgia rheumatica on steroids c/o generalized weakness. pt reports that she has been having generalized weakness for almost 2 wks. pt also with SOB for about 2 wks but that's improving.  pt was dx with PCP pneumonia, admitted 11 days ago to Medicine floor. pt has last dose of Bactrim tonight.  pt was d/c 5 days ago but concerned she wasn't ready to go home.  pt reports that she is still experiencing generalized weakness. also c/o difficulty swallowing causing her to have difficulty eating.  However pt reports this was checked while she was inpatient and she was told it was associated with anxiety.  Pt feels that food is stuck in stomach and reports constipation for about 8 days.  pt reports V x 1 today. +flatus. pt sent to CDU for observation including IVF, repeat labs, PT in AM, Social Work in AM. pt's vital signs stable overnight. pt still had generalized weakness overnight, unchanged, no new symptoms. was able to eat sandwich without difficulty. pt seen and evaluated by PT and SW- recommending rehab and SW was able to get pt into [facility] on [address] with PT on board. Case discussed with Dr. Shin. VSS; pt tolerating PO and ambulatory in the unit with walker. Understands the plan.

## 2018-02-01 NOTE — ED CDU PROVIDER DISPOSITION NOTE - PLAN OF CARE
1. Stay hydrated.  2. Continue Current Home Medications  3. Follow up with your Internist Dr. Diego Rocha in 1-2 days (Bring printed results to your doctor visit).  4. Return if symptoms, worsen, fever,, chest pain, dizziness and all other concerns.

## 2018-02-01 NOTE — PHYSICAL THERAPY INITIAL EVALUATION ADULT - DISCHARGE DISPOSITION, PT EVAL
rehabilitation facility/subacute rehab. If pt discharged home, pt will require assistance for all functional mobility, RW for gait and home with home PT services for general strengthening, fall prevention, balance training, safety assessment, and to restore pt's prior level of function.

## 2018-02-01 NOTE — PHYSICAL THERAPY INITIAL EVALUATION ADULT - IMPAIRMENTS CONTRIBUTING TO GAIT DEVIATIONS, PT EVAL
narrow base of support/impaired coordination/decreased flexibility/impaired balance/decreased strength

## 2018-02-01 NOTE — ED CDU PROVIDER SUBSEQUENT DAY NOTE - ATTENDING CONTRIBUTION TO CARE
I have personally performed a face to face diagnostic evaluation on this patient.  I have reviewed the ACP note and agree with the history, exam, and plan of care, except as noted.  Patient assessed and ambulating with PT with walker.  Patient tolerated PO. Social work evaluated patient and able to place in rehab directly. nonemergent ambulette arranged. stable for discharge to rehab. Patient agrees.

## 2018-02-01 NOTE — ED CDU PROVIDER SUBSEQUENT DAY NOTE - HISTORY
CDU NOTE BELGICA Pearl: pt resting comfortably, still reports generalized weakness- unchanged. pt's appetite improved, ate sandwich last night without dysphagia or discomfort. +flatus, though hasn't had BM yet. denies cp, lightheadedness. NAD most recent VSS. will continue IVF and continue laxative treatment.

## 2018-02-01 NOTE — PHYSICAL THERAPY INITIAL EVALUATION ADULT - ADDITIONAL COMMENTS
Pt states she lives with her mother in a private house with 12 steps to enter. Pt states she stays on the 1st floor. Pt states she ambulates by holding onto furniture at home. Pt states she needs assistance for ADLs.

## 2018-02-01 NOTE — ED CDU PROVIDER SUBSEQUENT DAY NOTE - PROGRESS NOTE DETAILS
CDU NOTE BELGICA Pearl: pt asleep. NAD. Pt resting comfortably. NAD. No complaints. VSS. Pending PT and SW eval. pt seen and evaluated by PT and SW- recommending rehab and SW was able to get pt into [facility] on [address] with PT on board. Case discussed with Dr. Shin. VSS; pt tolerating PO and ambulatory in the unit with walker. Understands the plan.

## 2018-02-24 LAB
CULTURE RESULTS: SIGNIFICANT CHANGE UP
SPECIMEN SOURCE: SIGNIFICANT CHANGE UP

## 2018-02-26 ENCOUNTER — APPOINTMENT (OUTPATIENT)
Dept: PULMONOLOGY | Facility: CLINIC | Age: 68
End: 2018-02-26
Payer: MEDICARE

## 2018-02-26 VITALS
TEMPERATURE: 98.5 F | HEART RATE: 79 BPM | RESPIRATION RATE: 16 BRPM | DIASTOLIC BLOOD PRESSURE: 78 MMHG | SYSTOLIC BLOOD PRESSURE: 118 MMHG | WEIGHT: 166 LBS | HEIGHT: 64 IN | BODY MASS INDEX: 28.34 KG/M2 | OXYGEN SATURATION: 95 %

## 2018-02-26 DIAGNOSIS — Z87.891 PERSONAL HISTORY OF NICOTINE DEPENDENCE: ICD-10-CM

## 2018-02-26 DIAGNOSIS — Z86.39 PERSONAL HISTORY OF OTHER ENDOCRINE, NUTRITIONAL AND METABOLIC DISEASE: ICD-10-CM

## 2018-02-26 DIAGNOSIS — Q89.3 SITUS INVERSUS: ICD-10-CM

## 2018-02-26 DIAGNOSIS — B59 PNEUMOCYSTOSIS: ICD-10-CM

## 2018-02-26 PROCEDURE — 94727 GAS DIL/WSHOT DETER LNG VOL: CPT

## 2018-02-26 PROCEDURE — 94060 EVALUATION OF WHEEZING: CPT

## 2018-02-26 PROCEDURE — 99214 OFFICE O/P EST MOD 30 MIN: CPT | Mod: 25

## 2018-02-26 PROCEDURE — 94729 DIFFUSING CAPACITY: CPT

## 2018-02-26 PROCEDURE — 99204 OFFICE O/P NEW MOD 45 MIN: CPT | Mod: 25

## 2018-02-26 RX ORDER — LEVOTHYROXINE SODIUM 88 UG/1
88 TABLET ORAL
Refills: 0 | Status: ACTIVE | COMMUNITY

## 2018-02-26 RX ORDER — SERTRALINE HYDROCHLORIDE 50 MG/1
50 TABLET, FILM COATED ORAL
Refills: 0 | Status: ACTIVE | COMMUNITY

## 2018-02-26 RX ORDER — PRIMAQUINE PHOSPHATE 15 MG/1
26.3 TABLET ORAL
Refills: 0 | Status: ACTIVE | COMMUNITY

## 2018-03-07 ENCOUNTER — TRANSCRIPTION ENCOUNTER (OUTPATIENT)
Age: 68
End: 2018-03-07

## 2018-03-17 LAB
CULTURE RESULTS: SIGNIFICANT CHANGE UP
SPECIMEN SOURCE: SIGNIFICANT CHANGE UP

## 2018-03-26 ENCOUNTER — APPOINTMENT (OUTPATIENT)
Dept: PULMONOLOGY | Facility: CLINIC | Age: 68
End: 2018-03-26
Payer: MEDICARE

## 2018-03-26 VITALS
DIASTOLIC BLOOD PRESSURE: 82 MMHG | HEIGHT: 64 IN | OXYGEN SATURATION: 97 % | BODY MASS INDEX: 28.68 KG/M2 | HEART RATE: 82 BPM | WEIGHT: 168 LBS | RESPIRATION RATE: 16 BRPM | SYSTOLIC BLOOD PRESSURE: 132 MMHG | TEMPERATURE: 98.3 F

## 2018-03-26 DIAGNOSIS — J18.9 PNEUMONIA, UNSPECIFIED ORGANISM: ICD-10-CM

## 2018-03-26 PROCEDURE — 99214 OFFICE O/P EST MOD 30 MIN: CPT

## 2018-03-26 RX ORDER — PREDNISONE 10 MG/1
10 TABLET ORAL
Qty: 30 | Refills: 1 | Status: ACTIVE | COMMUNITY
Start: 1900-01-01 | End: 1900-01-01

## 2018-03-26 RX ORDER — PREDNISONE 20 MG/1
20 TABLET ORAL
Qty: 60 | Refills: 0 | Status: COMPLETED | COMMUNITY
Start: 2017-12-14

## 2018-03-26 RX ORDER — PREDNISONE 5 MG/1
5 TABLET ORAL
Qty: 30 | Refills: 1 | Status: COMPLETED | COMMUNITY
Start: 2018-02-26 | End: 2018-03-26

## 2018-03-26 RX ORDER — ROSUVASTATIN CALCIUM 5 MG/1
5 TABLET, FILM COATED ORAL
Qty: 90 | Refills: 0 | Status: COMPLETED | COMMUNITY
Start: 2017-10-02

## 2018-12-01 NOTE — PROGRESS NOTE ADULT - PROBLEM SELECTOR PROBLEM 3
Ventricular Rate : 69   Atrial Rate : 69   P-R Interval : 174   QRS Duration : 74   Q-T Interval : 376   QTC Calculation(Bezet) : 402   P Axis : 50   R Axis : 24   T Axis : 52   Diagnosis : Normal sinus rhythm~Low voltage QRS~Cannot exclude Septal infarct (cited on or before 17-AUG-2010)~Abnormal ECG~When compared with ECG of 17-AUG-2010 23:58,~Questionable change in initial forces of Anterior leads~QT has shortened~Confirmed by HELGA TREVIÑO, MARLON ([id number]) on 8/21/2018 10:25:15 AM Hypothyroidism, unspecified type

## 2018-12-09 NOTE — CHART NOTE - NSCHARTNOTEFT_GEN_A_CORE
chart reviewed including pulmonary and ID notes.  patient was not in her room when I came to see her.  Agree with tx and evaluation for PCP as noted.  This does not seem inflammatory in nature    Would continue current prednisone, add Ca/D daily for bone protection, and she may follow up with her outpatient rheumatologist Dr. Watts with whom we were never able to get in touch -   Recall as needed.
congestion

## 2019-01-07 NOTE — CONSULT NOTE ADULT - CONSULT REQUESTED BY NAME
dr hutchins Detail Level: Detailed Quality 111:Pneumonia Vaccination Status For Older Adults: Pneumococcal Vaccination Previously Received Quality 130: Documentation Of Current Medications In The Medical Record: Current Medications Documented Quality 110: Preventive Care And Screening: Influenza Immunization: Influenza Immunization previously received during influenza season Quality 131: Pain Assessment And Follow-Up: Pain assessment using a standardized tool is documented as negative, no follow-up plan required
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
## 2020-09-18 NOTE — ED ADULT NURSE REASSESSMENT NOTE - GENITOURINARY ASSESSMENT
-- DO NOT REPLY / DO NOT REPLY ALL --  -- Message is from the [facility]--    COVID-19 Universal Screening: Negative    Request for Medical Clearance    Appointment secured? No    Type of surgery: Removal of penis implant   Location of surgery: [facility]   Date of surgery: 9/21/2020  Surgeon Name: Dr. Davidson    Other information: The patient is scheduled to have surgery on 9/21/2020. He called to schedule a medical clearance appointment. Please contact him as soon as possible to discuss.     Caller Information       Type Contact Phone    09/16/2020 02:06 PM CDT Phone (Incoming) Brad Puckett (Self) [phone] (M)          Alternative phone number: [phone]     Turnaround time given to caller:   \"This message will be sent to [state Provider's name]. The clinical team will fulfill your request as soon as they review your message.\"  
Called pt mltcb, paperwork for surgery is in Dr. Oliver mailbox  
Received IM from [facility] that referral was approved but she did not update referral will contact her tomorrow   
Referral and  Amended note faxed to surgery   
Referral approved per patient chart by [facility].   
See other task per Coreen you can do an addendum to the note done on 8/28   
There is no availability with anyone for [facility] do you want to double book yourself to accommodate this pt   
WDL

## 2020-11-13 NOTE — PROGRESS NOTE ADULT - ASSESSMENT
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

## 2021-11-29 NOTE — PROGRESS NOTE ADULT - SUBJECTIVE AND OBJECTIVE BOX
Patient is a 67y old  Female who presents with a chief complaint of SOB, palpitations (20 Jan 2018 14:58)      SUBJECTIVE / OVERNIGHT EVENTS:    MEDICATIONS  (STANDING):  enoxaparin Injectable 40 milliGRAM(s) SubCutaneous every 24 hours  insulin lispro (HumaLOG) corrective regimen sliding scale   SubCutaneous three times a day before meals  insulin lispro (HumaLOG) corrective regimen sliding scale   SubCutaneous at bedtime  levothyroxine 88 MICROGram(s) Oral daily  melatonin 5 milliGRAM(s) Oral at bedtime  predniSONE   Tablet 40 milliGRAM(s) Oral two times a day  sertraline 50 milliGRAM(s) Oral daily  trimethoprim / sulfamethoxazole IVPB      trimethoprim / sulfamethoxazole IVPB 400 milliGRAM(s) IV Intermittent every 8 hours    MEDICATIONS  (PRN):  acetaminophen   Tablet. 650 milliGRAM(s) Oral every 6 hours PRN mild or moderate pain  ondansetron    Tablet 4 milliGRAM(s) Oral every 4 hours PRN Nausea and/or Vomiting  QUEtiapine 25 milliGRAM(s) Oral every 6 hours PRN agitation, anxiety, insomnia        CAPILLARY BLOOD GLUCOSE      POCT Blood Glucose.: 103 mg/dL (25 Jan 2018 21:08)  POCT Blood Glucose.: 136 mg/dL (25 Jan 2018 17:36)  POCT Blood Glucose.: 100 mg/dL (25 Jan 2018 12:29)  POCT Blood Glucose.: 171 mg/dL (25 Jan 2018 08:43)    I&O's Summary    25 Jan 2018 07:01  -  26 Jan 2018 07:00  --------------------------------------------------------  IN: 1325 mL / OUT: 0 mL / NET: 1325 mL        PHYSICAL EXAM:  GENERAL: NAD, well-developed  HEAD:  Atraumatic, Normocephalic  EYES: EOMI, PERRLA, conjunctiva and sclera clear  NECK: Supple, No JVD  CHEST/LUNG: Clear to auscultation bilaterally; No wheeze  HEART: Regular rate and rhythm; No murmurs, rubs, or gallops  ABDOMEN: Soft, Nontender, Nondistended; Bowel sounds present  EXTREMITIES:  2+ Peripheral Pulses, No clubbing, cyanosis, or edema  PSYCH: AAOx3  NEUROLOGY: non-focal  SKIN: No rashes or lesions    LABS:  (01-25 @ 09:04)                        11.8  14.23 )-----------( 191                 34.2    Neutrophils = 10.10 (71.0%)  Lymphocytes = 2.56 (18.0%)  Eosinophils = 0.00 (0.0%)  Basophils = 0.00 (0.0%)  Monocytes = 0.71 (5.0%)  Bands = --%    WBC Trend: 14.23<--, 13.0<--, 7.96<--  Hb Trend: 11.8<--, 12.1<--, 11.3<--, 12.5<--, 13.1<--  Plt Trend: 191<--, 270<--, 159<--, 197<--, 198<--  01-25    135  |  99  |  12  ----------------------------<  93  5.3   |  23  |  0.98    Ca    9.2      25 Jan 2018 09:04      Creatinine Trend: 0.98<--, 1.05<--, 0.96<--, 0.89<--, 0.81<--, 0.87<--            Microbiology:  Urine Cx:  Blood Cx:    RADIOLOGY & ADDITIONAL TESTS:  X- Ray:  CT:  Ultrasound:  [ ] imaging personally reviewed and interpreted by me    Consultant(s) Notes Reviewed:      Care Discussed with Consultants/Other Providers: Statement Selected Patient is a 67y old  Female who presents with a chief complaint of SOB, palpitations (20 Jan 2018 14:58)      SUBJECTIVE / OVERNIGHT EVENTS: No acute events overnight. Patient states she had a rough afternoon and night. When asked why she cannot articulate exactly what is wrong. She describes it as a "generalized unwell feeling".     MEDICATIONS  (STANDING):  enoxaparin Injectable 40 milliGRAM(s) SubCutaneous every 24 hours  insulin lispro (HumaLOG) corrective regimen sliding scale   SubCutaneous three times a day before meals  insulin lispro (HumaLOG) corrective regimen sliding scale   SubCutaneous at bedtime  levothyroxine 88 MICROGram(s) Oral daily  melatonin 5 milliGRAM(s) Oral at bedtime  predniSONE   Tablet 40 milliGRAM(s) Oral two times a day  sertraline 50 milliGRAM(s) Oral daily  trimethoprim / sulfamethoxazole IVPB      trimethoprim / sulfamethoxazole IVPB 400 milliGRAM(s) IV Intermittent every 8 hours    MEDICATIONS  (PRN):  acetaminophen   Tablet. 650 milliGRAM(s) Oral every 6 hours PRN mild or moderate pain  ondansetron    Tablet 4 milliGRAM(s) Oral every 4 hours PRN Nausea and/or Vomiting  QUEtiapine 25 milliGRAM(s) Oral every 6 hours PRN agitation, anxiety, insomnia        CAPILLARY BLOOD GLUCOSE      POCT Blood Glucose.: 103 mg/dL (25 Jan 2018 21:08)  POCT Blood Glucose.: 136 mg/dL (25 Jan 2018 17:36)  POCT Blood Glucose.: 100 mg/dL (25 Jan 2018 12:29)  POCT Blood Glucose.: 171 mg/dL (25 Jan 2018 08:43)    I&O's Summary    25 Jan 2018 07:01  -  26 Jan 2018 07:00  --------------------------------------------------------  IN: 1325 mL / OUT: 0 mL / NET: 1325 mL        PHYSICAL EXAM:  GENERAL: NAD, well-developed  HEAD:  Atraumatic, Normocephalic  EYES: EOMI, PERRLA, conjunctiva and sclera clear  NECK: Supple, No JVD  CHEST/LUNG: Clear to auscultation bilaterally; No wheeze  HEART: Regular rate and rhythm; No murmurs, rubs, or gallops  ABDOMEN: Soft, Nontender, Nondistended; Bowel sounds present  EXTREMITIES:  2+ Peripheral Pulses, No clubbing, cyanosis, or edema  PSYCH: AAOx3  NEUROLOGY: non-focal  SKIN: No rashes or lesions    LABS:  (01-25 @ 09:04)                        11.8  14.23 )-----------( 191                 34.2    Neutrophils = 10.10 (71.0%)  Lymphocytes = 2.56 (18.0%)  Eosinophils = 0.00 (0.0%)  Basophils = 0.00 (0.0%)  Monocytes = 0.71 (5.0%)  Bands = --%    WBC Trend: 14.23<--, 13.0<--, 7.96<--  Hb Trend: 11.8<--, 12.1<--, 11.3<--, 12.5<--, 13.1<--  Plt Trend: 191<--, 270<--, 159<--, 197<--, 198<--  01-25    135  |  99  |  12  ----------------------------<  93  5.3   |  23  |  0.98    Ca    9.2      25 Jan 2018 09:04      Creatinine Trend: 0.98<--, 1.05<--, 0.96<--, 0.89<--, 0.81<--, 0.87<--            Microbiology:  Urine Cx:  Blood Cx:    RADIOLOGY & ADDITIONAL TESTS:  X- Ray:  CT:  Ultrasound:  [ ] imaging personally reviewed and interpreted by me    Consultant(s) Notes Reviewed:      Care Discussed with Consultants/Other Providers: Patient is a 67y old  Female who presents with a chief complaint of SOB, palpitations (20 Jan 2018 14:58)      SUBJECTIVE / OVERNIGHT EVENTS: No acute events overnight. Patient states she had a rough afternoon and night. When asked why she cannot articulate exactly what is wrong. She describes it as a "generalized unwell feeling". She continues to report trouble swallowing where she feels it is not going straight down but feels it goes down the sides of her throat. She is again getting tearful discussing her symptoms and medical history.     MEDICATIONS  (STANDING):  enoxaparin Injectable 40 milliGRAM(s) SubCutaneous every 24 hours  insulin lispro (HumaLOG) corrective regimen sliding scale   SubCutaneous three times a day before meals  insulin lispro (HumaLOG) corrective regimen sliding scale   SubCutaneous at bedtime  levothyroxine 88 MICROGram(s) Oral daily  melatonin 5 milliGRAM(s) Oral at bedtime  predniSONE   Tablet 40 milliGRAM(s) Oral two times a day  sertraline 50 milliGRAM(s) Oral daily  trimethoprim / sulfamethoxazole IVPB      trimethoprim / sulfamethoxazole IVPB 400 milliGRAM(s) IV Intermittent every 8 hours    MEDICATIONS  (PRN):  acetaminophen   Tablet. 650 milliGRAM(s) Oral every 6 hours PRN mild or moderate pain  ondansetron    Tablet 4 milliGRAM(s) Oral every 4 hours PRN Nausea and/or Vomiting  QUEtiapine 25 milliGRAM(s) Oral every 6 hours PRN agitation, anxiety, insomnia        CAPILLARY BLOOD GLUCOSE      POCT Blood Glucose.: 103 mg/dL (25 Jan 2018 21:08)  POCT Blood Glucose.: 136 mg/dL (25 Jan 2018 17:36)  POCT Blood Glucose.: 100 mg/dL (25 Jan 2018 12:29)  POCT Blood Glucose.: 171 mg/dL (25 Jan 2018 08:43)    I&O's Summary    25 Jan 2018 07:01  -  26 Jan 2018 07:00  --------------------------------------------------------  IN: 1325 mL / OUT: 0 mL / NET: 1325 mL        PHYSICAL EXAM:  Vital Signs Last 24 Hrs  T(C): 36.6 (26 Jan 2018 08:03), Max: 36.6 (25 Jan 2018 20:10)  T(F): 97.8 (26 Jan 2018 08:03), Max: 97.9 (25 Jan 2018 20:10)  HR: 90 (26 Jan 2018 08:03) (90 - 95)  BP: 165/82 (26 Jan 2018 08:03) (124/78 - 165/82)  BP(mean): --  RR: 18 (26 Jan 2018 08:03) (18 - 20)  SpO2: 98% (26 Jan 2018 08:03) (98% - 98%)    GENERAL: tearful, not in respiratory distress on RA  HEAD/FACE:  Atraumatic, Normocephalic  EYES: EOMI, PERRLA, conjunctiva and sclera clear  NECK: Supple, No JVD  CHEST/LUNG: Clear to auscultation bilaterally; No wheezes or rales  HEART: Regular rate and rhythm; No murmurs, rubs, or gallops  ABDOMEN: Soft, Nontender, Nondistended; Bowel sounds present  BACK: ttp of paraspinal muscles in thoracic region, ttp at SI joints, no tenderness on spine  EXTREMITIES:  2+ Peripheral Pulses, No clubbing, cyanosis, or edema, no ttp  PSYCH: AAOx3  NEUROLOGY: non-focal  SKIN: no rashes or lesions, redness on face resolved    LABS:  (01-25 @ 09:04)                        11.8  14.23 )-----------( 191                 34.2    Neutrophils = 10.10 (71.0%)  Lymphocytes = 2.56 (18.0%)  Eosinophils = 0.00 (0.0%)  Basophils = 0.00 (0.0%)  Monocytes = 0.71 (5.0%)  Bands = --%    WBC Trend: 14.23<--, 13.0<--, 7.96<--  Hb Trend: 11.8<--, 12.1<--, 11.3<--, 12.5<--, 13.1<--  Plt Trend: 191<--, 270<--, 159<--, 197<--, 198<--  01-25    135  |  99  |  12  ----------------------------<  93  5.3   |  23  |  0.98    Ca    9.2      25 Jan 2018 09:04      Creatinine Trend: 0.98<--, 1.05<--, 0.96<--, 0.89<--, 0.81<--, 0.87<--            Microbiology:  galactomannan negative  histoplasma negative  gram stain of BAL negative    RADIOLOGY & ADDITIONAL TESTS:  MBS pending    Consultant(s) Notes Reviewed:  pulm, ID    Care Discussed with Consultants/Other Providers:

## 2022-04-19 NOTE — PROGRESS NOTE ADULT - PROBLEM SELECTOR PLAN 4
- no personal or family history of kidney disease, no history of diabetes  - reports darkening in color of urine, no other symptoms  - f/u rheumatologic w/u as above  - f/u urine studies 62

## 2022-10-06 NOTE — PROGRESS NOTE ADULT - SUBJECTIVE AND OBJECTIVE BOX
Patient ambulated in room with cane, patient's baseline, no issues or complaints. Patient is a 67y old  Female who presents with a chief complaint of SOB, palpitations (2018 14:58)  Pt feels much better today.  OOB and ambulating with less SOB  NO CP or palps  Tolerating RX  Had TTE and Bronchoscopy today    Allergies:   Vitamin E (Hives)      MEDICATIONS  (STANDING):  enoxaparin Injectable 40 milliGRAM(s) SubCutaneous every 24 hours  insulin lispro (HumaLOG) corrective regimen sliding scale   SubCutaneous three times a day before meals  insulin lispro (HumaLOG) corrective regimen sliding scale   SubCutaneous at bedtime  levothyroxine 88 MICROGram(s) Oral daily  melatonin 5 milliGRAM(s) Oral at bedtime  predniSONE   Tablet 40 milliGRAM(s) Oral two times a day  sertraline 50 milliGRAM(s) Oral daily  sodium chloride 0.9%. 1000 milliLiter(s) (75 mL/Hr) IV Continuous <Continuous>  trimethoprim / sulfamethoxazole IVPB      trimethoprim / sulfamethoxazole IVPB 400 milliGRAM(s) IV Intermittent every 8 hours    MEDICATIONS  (PRN):  acetaminophen   Tablet. 650 milliGRAM(s) Oral every 6 hours PRN mild or moderate pain  ondansetron    Tablet 4 milliGRAM(s) Oral every 4 hours PRN Nausea and/or Vomiting  QUEtiapine 25 milliGRAM(s) Oral every 6 hours PRN agitation, anxiety, insomnia      ROS:     A comprehensive review of systems was performed and pertinent items are noted in the history above. A detailed ROS is as follows:    non-contributory    Daily     Daily Weight in k.6 (2018 07:10)  Drug Dosing Weight  Height (cm): 165.1 (2018 22:40)  Weight (kg): 77.5 (2018 22:40)  BMI (kg/m2): 28.4 (2018 22:40)  BSA (m2): 1.85 (2018 22:40)  Vital Signs Last 24 Hrs  T(C): 36.7 (2018 16:30), Max: 36.8 (2018 23:54)  T(F): 98.1 (2018 16:30), Max: 98.3 (2018 23:54)  HR: 82 (2018 16:30) (82 - 89)  BP: 119/70 (2018 16:30) (119/70 - 151/83)  BP(mean): --  RR: 18 (2018 16:30) (18 - 20)  SpO2: 96% (2018 16:30) (92% - 96%)    I&O's Summary    2018 07:01  -  2018 07:00  --------------------------------------------------------  IN: 525 mL / OUT: 700 mL / NET: -175 mL    2018 07:01  -  2018 18:13  --------------------------------------------------------  IN: 0 mL / OUT: 0 mL / NET: 0 mL        PHYSICAL EXAM:    General Appearance:    Comfortable, AAO X 3, in no distress.   HEENT:                       Atraumatic, PERRLA, EOMI, conjunctiva clear.   Neck:                          Supple, no adenopathy, no thyromegaly, no JVD, no carotid bruit  Back:                          Symmetric, no CV angle fullness or tenderness, no soft tissue tenderness.  Chest:                         Clear to auscultation, no wheezes, rales or rhonchi, symmetric air entry, no tachypnea, retractions or cyanosis  Heart:                          S1, S2 normal, no gallop, no murmur.  Abdomen:                    Soft, non-tender, bowel sounds active. No palpable masses.   Extremities:                 no cyanosis, no edema, no joint swelling. Peripheral pulses normal  Skin:                           Skin color, texture normal. No rashes   Neurologic:                  Alert and oriented X3, cranial nerves intact, sensory and motor normal.      INTERPRETATION OF TELEMETRY:    ECG: Ventricular Rate 83 BPM    Atrial Rate 83 BPM    P-R Interval 168 ms    QRS Duration 68 ms     ms    QTc 446 ms    R Axis 130 degrees    T Axis 63 degrees    Diagnosis Line ***Dextrocardia  NORMAL SINUS RHYTHM  ANTEROLATERAL INFARCT , AGE UNDETERMINED  ABNORMAL ECG    TTE:  Patient name: JOHN GAMEZ  YOB: 1950   Age: 67 (F)   MR#: [id number]  Study Date: 2018  Location: [address] [place], [place] [id number]: [facility]  Study quality: Technically fair  Referring Physician: Mohsinuzzaman Khan, MD  BloodPressure: 151/83 mmHg  Height: 165 cm  Weight: 77 kg  BSA: 1.9 m2  ------------------------------------------------------------------------  PROCEDURE: Transthoracic echocardiogram with 2-D, M-Mode  and complete spectral and color flow Doppler.  INDICATION: Dextrocardia (Q24.0), Dyspnea, unspecified  (R06.00)  ------------------------------------------------------------------------  Dimensions:    Normal Values:  LA:     3.5    2.0 - 4.0 cm  Ao:     2.7    2.0 - 3.8 cm  SEPTUM: 0.9    0.6 - 1.2 cm  PWT:    0.8    0.6 - 1.1 cm  LVIDd:  3.7    3.0 - 5.6 cm  LVIDs:  2.2    1.8 - 4.0 cm  Derived variables:  LVMI: 48 g/m2  RWT: 0.43  EF (Visual Estimate): 65-70 %  ------------------------------------------------------------------------  Observations:  Mitral Valve: Mitral valve structure appears normal.  Minimal mitral regurgitation.  Aortic Valve/Aorta: Aortic valve appears tricuspid with  normal cusp excursion. No aortic valve regurgitation seen.  Aortic Root (SoV): 2.7 cm.  Left Atrium: Normal left atrial size.  LA volume index = 18  cc/m2.  Left Ventricle: Normal left ventricular systolic function  with an estimated ejection fraction of 65-70%. No segmental  wall motion abnormalities. Normal left ventricular internal  dimensions and wall thicknesses. Normal diastolic function.  Right Heart: Normal right atrial size. Normal right  ventricular size and systolic function. Tricuspid valve not  well visualized. Mild tricuspid regurgitation. No pulmonic  stenosis.  No significant pulmonic regurgitation.  Pericardium/Pleura: No pericardial effusion seen.  Hemodynamic: Estimated right ventricular systolic pressure  equals 33 mm Hg, assuming right atrial pressure equals 8 mm  Hg, consistent with normal pulmonary pressures.  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  1. Normal left ventricular internal dimensions and wall  thicknesses.  2. Normal left ventricular systolic function with an  estimated ejection fraction of 65-70%. No segmental wall  motion abnormalities.  3. Normal diastolic function.  4. Normal right ventricular size and systolic function.    LABS:                        12.1   13.0  )-----------( 270      ( 2018 07:35 )             33.0         135  |  99  |  11  ----------------------------<  192<H>  3.9   |  20<L>  |  1.05    Ca    9.1      2018 07:35             @ 07:35  Trop-I --  CK     66  CK MB  --     @ 05:01  Trop-I --  CK     66  CK MB  --    Pro BNP  74  @ 05:01  D Dimer  --  @ 05:01    PT/INR - ( 2018 07:35 )   PT: 11.4 sec;   INR: 1.04 ratio         PTT - ( 2018 07:35 )  PTT:26.6 sec    ABG - ( 2018 17:37 )  pH: 7.46  /  pCO2: 30    /  pO2: 62    / HCO3: 21    / Base Excess: -1.8  /  SaO2: 93                    RADIOLOGY & ADDITIONAL STUDIES:    HEALTH ISSUES - PROBLEM Dx:  Acute hypoxemic respiratory failure: Acute hypoxemic respiratory failure  Type 2 diabetes mellitus without complication, without long-term current use of insulin: Type 2 diabetes mellitus without complication, without long-term current use of insulin  Need for prophylactic measure: Need for prophylactic measure  Depression, unspecified depression type: Depression, unspecified depression type  Polymyalgia rheumatica: Polymyalgia rheumatica  Hypothyroidism, unspecified type: Hypothyroidism, unspecified type  Proteinuria, unspecified type: Proteinuria, unspecified type  Shortness of breath: Shortness of breath

## 2024-11-06 NOTE — PATIENT PROFILE ADULT. - TEACHING/LEARNING LEARNING PREFERENCES
[facility]   Cardiology Admission Note              Date:   2024  Patient name: Reina Marin  Date of admission:  2024  7:03 AM  MRN:   [id number]  YOB: 1956    Primary Care physician: Mary Florentino PA    Reason for Admission:  arrhythmia    CHIEF COMPLAINT:  Premature ventricular contractions    History Obtained From:  patient, spouse    HISTORY OF PRESENT ILLNESS:    Mrs. Marin is a pleasant 68 year old female with a medical history significant for symptomatic premature ventricular contractions, paroxysmal atrial fibrillation, sick sinus syndrome status post DC pacemaker, hypothyroidism, history of breast cancer, hypertension, peripheral artery disease, GERD, and obesity who presents from home for PVC ablation.    Past Medical History:   has a past medical history of Abnormal Pap smear of cervix, Allergic, Allergic rhinitis, Aortic stenosis, mild-echo 2016, Arthritis, Atrial fibrillation (HCC), BMI 40.0-44.9, adult, Breast cancer (HCC), Cancer (HCC), CHF (congestive heart failure) (HCC), Depression, GERD (gastroesophageal reflux disease), H/O laparoscopic adjustable gastric banding, Heart palpitations, History of chemotherapy, History of radiation therapy, HPV (human papilloma virus) infection, HPV test positive, Hyperlipidemia, Hypertension, Hypothyroidism, Meningioma (HCC), Mild aortic regurgitation-echo 2016, Mild dysplasia of cervix, Obesity, Sleep apnea, Type 2 diabetes mellitus with chronic kidney disease, and Urge incontinence.    Past Surgical History:   has a past surgical history that includes Appendectomy; Cholecystectomy; Colonoscopy; Lap Band (); Tunneled venous port placement ();  section (,,,);  section; Breast surgery (); Breast biopsy; Breast lumpectomy; Hysterectomy (); Lap Band (); Ovary removal; Thyroidectomy, partial (Left, 2000); Upper gastrointestinal endoscopy (06/10/2016); Partial  arthroplasty, left    Amaurosis fugax of right eye    Bradycardia    Bigeminy    Symptomatic bradycardia    Arrhythmia    Pacemaker-[employer] DC implant 8/28/2024-MRI COMPATIBLE     RECOMMENDATIONS:  PVC ablation    Discussed with patient and nursing.    All questions and concerns were addressed to the patient/family. Alternatives to my treatment were discussed. The note was completed using EMR. Every effort was made to ensure accuracy; however, inadvertent computerized transcription errors may be present.    Rich Harper MD  Cardiac Electrophysiology  [facility]  [phone] [facility]   audio/written material